# Patient Record
Sex: MALE | Race: WHITE | NOT HISPANIC OR LATINO
[De-identification: names, ages, dates, MRNs, and addresses within clinical notes are randomized per-mention and may not be internally consistent; named-entity substitution may affect disease eponyms.]

---

## 2021-01-25 ENCOUNTER — NON-APPOINTMENT (OUTPATIENT)
Age: 41
End: 2021-01-25

## 2021-01-27 ENCOUNTER — NON-APPOINTMENT (OUTPATIENT)
Age: 41
End: 2021-01-27

## 2021-01-27 ENCOUNTER — APPOINTMENT (OUTPATIENT)
Dept: INTERNAL MEDICINE | Facility: CLINIC | Age: 41
End: 2021-01-27
Payer: COMMERCIAL

## 2021-01-27 VITALS
OXYGEN SATURATION: 96 % | HEIGHT: 73 IN | RESPIRATION RATE: 14 BRPM | SYSTOLIC BLOOD PRESSURE: 158 MMHG | BODY MASS INDEX: 33.4 KG/M2 | TEMPERATURE: 98.1 F | DIASTOLIC BLOOD PRESSURE: 99 MMHG | WEIGHT: 252 LBS | HEART RATE: 116 BPM

## 2021-01-27 VITALS — SYSTOLIC BLOOD PRESSURE: 160 MMHG | DIASTOLIC BLOOD PRESSURE: 100 MMHG

## 2021-01-27 DIAGNOSIS — R91.1 SOLITARY PULMONARY NODULE: ICD-10-CM

## 2021-01-27 DIAGNOSIS — Z78.9 OTHER SPECIFIED HEALTH STATUS: ICD-10-CM

## 2021-01-27 DIAGNOSIS — Z91.89 OTHER SPECIFIED PERSONAL RISK FACTORS, NOT ELSEWHERE CLASSIFIED: ICD-10-CM

## 2021-01-27 DIAGNOSIS — Z87.891 PERSONAL HISTORY OF NICOTINE DEPENDENCE: ICD-10-CM

## 2021-01-27 PROCEDURE — 93000 ELECTROCARDIOGRAM COMPLETE: CPT

## 2021-01-27 PROCEDURE — 99072 ADDL SUPL MATRL&STAF TM PHE: CPT

## 2021-01-27 PROCEDURE — 99204 OFFICE O/P NEW MOD 45 MIN: CPT

## 2021-01-27 NOTE — PHYSICAL EXAM

## 2021-01-27 NOTE — ASSESSMENT
[FreeTextEntry1] : INITIAL VISIT OF 40 Y OLD MALE WITH SYMPTOMATIC R CERUMEN IMPACTION FOR OVER 1 M = ENT EVAL\par ? HTN ,ABNORMAL ECHO ,WCS AND SINUS TACH = LABS AND EKG ;PT WILL BRING RECORDS FROM [facility] \par DYSLIPIDEMIA AND FATTY LIVER= LABS \par RTO IN 2 WEEKS

## 2021-01-27 NOTE — HISTORY OF PRESENT ILLNESS
[de-identified] : PT COMES FOR INITIAL VIIST \par CC OF CLOGGED R EAR CANAL FOR OVER 1 M ,SEEN BY TELEHEALTH AND AT URGENT CARE USED OTC MEDS WITH NO RESOLUTION \par ALSO CONCERN ABOUT DX OF FATTY LIVER IN [facility] 2019\par DX WITH R LUNG NODULE 2018 BY CXR ,REPEAT CXR 2019 = STABLE \par DX WITH ABNORMAL ECHO AND WCS\par

## 2021-01-28 LAB
25(OH)D3 SERPL-MCNC: 19.7 NG/ML
ALBUMIN SERPL ELPH-MCNC: 4.8 G/DL
ALP BLD-CCNC: 98 U/L
ALT SERPL-CCNC: 73 U/L
ANION GAP SERPL CALC-SCNC: 16 MMOL/L
APPEARANCE: CLEAR
AST SERPL-CCNC: 64 U/L
BILIRUB SERPL-MCNC: 0.4 MG/DL
BILIRUBIN URINE: NEGATIVE
BLOOD URINE: NEGATIVE
BUN SERPL-MCNC: 11 MG/DL
CALCIUM SERPL-MCNC: 10.6 MG/DL
CHLORIDE SERPL-SCNC: 99 MMOL/L
CHOLEST SERPL-MCNC: 257 MG/DL
CO2 SERPL-SCNC: 24 MMOL/L
COLOR: YELLOW
CREAT SERPL-MCNC: 0.84 MG/DL
CREAT SPEC-SCNC: 170 MG/DL
GLUCOSE QUALITATIVE U: NEGATIVE
GLUCOSE SERPL-MCNC: 171 MG/DL
HAV IGM SER QL: NONREACTIVE
HBV CORE IGM SER QL: NONREACTIVE
HBV SURFACE AG SER QL: NONREACTIVE
HCV AB SER QL: NONREACTIVE
HCV S/CO RATIO: 0.11 S/CO
HDLC SERPL-MCNC: 50 MG/DL
KETONES URINE: NEGATIVE
LDLC SERPL CALC-MCNC: 163 MG/DL
LEUKOCYTE ESTERASE URINE: NEGATIVE
MICROALBUMIN 24H UR DL<=1MG/L-MCNC: <1.2 MG/DL
MICROALBUMIN/CREAT 24H UR-RTO: NORMAL MG/G
NITRITE URINE: NEGATIVE
NONHDLC SERPL-MCNC: 206 MG/DL
PH URINE: 5.5
POTASSIUM SERPL-SCNC: 4.8 MMOL/L
PROT SERPL-MCNC: 7.9 G/DL
PROTEIN URINE: NEGATIVE
PSA FREE FLD-MCNC: 47 %
PSA FREE SERPL-MCNC: 0.15 NG/ML
PSA SERPL-MCNC: 0.33 NG/ML
SODIUM SERPL-SCNC: 139 MMOL/L
SPECIFIC GRAVITY URINE: 1.02
TRIGL SERPL-MCNC: 218 MG/DL
TSH SERPL-ACNC: 1.18 UIU/ML
UROBILINOGEN URINE: NORMAL

## 2021-02-02 ENCOUNTER — NON-APPOINTMENT (OUTPATIENT)
Age: 41
End: 2021-02-02

## 2021-02-02 LAB
BASOPHILS # BLD AUTO: 0.06 K/UL
BASOPHILS NFR BLD AUTO: 0.6 %
EOSINOPHIL # BLD AUTO: 0.22 K/UL
EOSINOPHIL NFR BLD AUTO: 2.3 %
HCT VFR BLD CALC: 52.1 %
HGB BLD-MCNC: 16.9 G/DL
IMM GRANULOCYTES NFR BLD AUTO: 0.3 %
LYMPHOCYTES # BLD AUTO: 3.02 K/UL
LYMPHOCYTES NFR BLD AUTO: 31.9 %
MAN DIFF?: NORMAL
MCHC RBC-ENTMCNC: 29.1 PG
MCHC RBC-ENTMCNC: 32.4 GM/DL
MCV RBC AUTO: 89.7 FL
MONOCYTES # BLD AUTO: 0.72 K/UL
MONOCYTES NFR BLD AUTO: 7.6 %
NEUTROPHILS # BLD AUTO: 5.42 K/UL
NEUTROPHILS NFR BLD AUTO: 57.3 %
PLATELET # BLD AUTO: 391 K/UL
RBC # BLD: 5.81 M/UL
RBC # FLD: 13.1 %
WBC # FLD AUTO: 9.47 K/UL

## 2021-02-10 ENCOUNTER — TRANSCRIPTION ENCOUNTER (OUTPATIENT)
Age: 41
End: 2021-02-10

## 2021-02-10 ENCOUNTER — APPOINTMENT (OUTPATIENT)
Dept: INTERNAL MEDICINE | Facility: CLINIC | Age: 41
End: 2021-02-10
Payer: COMMERCIAL

## 2021-02-10 VITALS
SYSTOLIC BLOOD PRESSURE: 143 MMHG | RESPIRATION RATE: 14 BRPM | HEART RATE: 91 BPM | TEMPERATURE: 98.1 F | WEIGHT: 252 LBS | HEIGHT: 73 IN | DIASTOLIC BLOOD PRESSURE: 91 MMHG | OXYGEN SATURATION: 96 % | BODY MASS INDEX: 33.4 KG/M2

## 2021-02-10 VITALS — DIASTOLIC BLOOD PRESSURE: 98 MMHG | SYSTOLIC BLOOD PRESSURE: 140 MMHG

## 2021-02-10 DIAGNOSIS — R73.9 HYPERGLYCEMIA, UNSPECIFIED: ICD-10-CM

## 2021-02-10 PROCEDURE — 99072 ADDL SUPL MATRL&STAF TM PHE: CPT

## 2021-02-10 PROCEDURE — 99214 OFFICE O/P EST MOD 30 MIN: CPT

## 2021-02-10 NOTE — ASSESSMENT
[FreeTextEntry1] : 40 Y OLD MALE WITH NEWLY DX HTN = AMLODIPINE- OLMESARTAN 5-20 RX SENT \par ? DM = LABS\par DYSLIPIDEMIA,OBESITY AND FATTY LIVER= STRICT DIET AND EXERCISE \par RTO IN 2 WEEKS\par ECHO ORDERED

## 2021-02-10 NOTE — PHYSICAL EXAM

## 2021-02-10 NOTE — HISTORY OF PRESENT ILLNESS
[de-identified] : PT WAS SEEN AT ER [facility] 5 DAYS AGO DUE TO HA AND ELEVATED BP AT HOME ;WAS RX AMLODIPINE 5 MG AND D/C HOURS LATER SINCE THEN -089-\par CC OF RECURRENT UPPER BODY MUSCLE PAIN AND SPASM THAT INITIALLY DEVELOPED 4 Y AGO\par CC OF PRURITIC SCALP RASH \par LABS 2 WEEKS AGO  AND ELEVATED TRIG AND LFT'S

## 2021-02-11 LAB
ESTIMATED AVERAGE GLUCOSE: 148 MG/DL
FRUCTOSAMINE SERPL-MCNC: 278 UMOL/L
GLUCOSE BS SERPL-MCNC: 97 MG/DL
HBA1C MFR BLD HPLC: 6.8 %

## 2021-02-16 ENCOUNTER — APPOINTMENT (OUTPATIENT)
Dept: HEART AND VASCULAR | Facility: CLINIC | Age: 41
End: 2021-02-16
Payer: COMMERCIAL

## 2021-02-16 VITALS
SYSTOLIC BLOOD PRESSURE: 132 MMHG | HEIGHT: 73 IN | DIASTOLIC BLOOD PRESSURE: 83 MMHG | RESPIRATION RATE: 14 BRPM | WEIGHT: 250 LBS | TEMPERATURE: 98 F | HEART RATE: 90 BPM | BODY MASS INDEX: 33.13 KG/M2 | OXYGEN SATURATION: 96 %

## 2021-02-16 PROCEDURE — 93306 TTE W/DOPPLER COMPLETE: CPT

## 2021-02-16 PROCEDURE — 99215 OFFICE O/P EST HI 40 MIN: CPT

## 2021-02-16 PROCEDURE — 99072 ADDL SUPL MATRL&STAF TM PHE: CPT

## 2021-02-16 NOTE — ASSESSMENT
[FreeTextEntry1] : 39 yo M PMH HTN, HLD, DM here for first evaluation and BP control\par \par CHEST PAIN\par -somehow atypical \par -negative stress test 2 years ago\par -obtain  records from cath at [facility]\par -echo today to r/o any WMA\par -will assess in 2 weeks need for repeat ischemia eval with stress test based on symptoms and previous cath lab results \par \par HTN \par borderline high today \par -cont with current meds\par -keep BP log at home and report BP> 140/90 \par \par HLD\par ldl 163, trig 218, chol 257, hdl 50 on 1.28.2021\par -recc starting atorvastatin 20 mg daily in the setting of DM\par -healthy diet and exercise\par  \par f/u in 2 weeks

## 2021-02-16 NOTE — HISTORY OF PRESENT ILLNESS
[FreeTextEntry1] : 39 yo M PMH HTN, HLD, DM here for first evaluation and BP control\par The patient reports episodes of chest pain for many years. Reports recent work up in [place] for chest pain and reports negative work up.Reports that he had a coronary angiogram 4 years at [facility] (no documentation available)\par Pain is substernal, left and right side of his chest radiating to the back.Reports excellent exercise tolerance\par Denies c palpitations, syncope, presyncope, LE edema, orthopnea.  \par He reports that currently he is under  a lot of stress at work.  \par \par PMH\par HTN\par HLD\par DM\par \par PSH\par meniscus repair\par \par ALL\par NKDA\par \par MEDS\par amlodipine 5-20 mg daily \par \par SH\par former smoker, stopped few months ago, social etoh, no drugs\par works for dept of [employer] \par \par EKG 1/27/2021 SR,wnl\par \par EXERCISE STRESS TEST ([facility]) 9/20/2018\par 8:40 Aly protocol, 91% MPHR \par negative stress test \par

## 2021-02-16 NOTE — PHYSICAL EXAM
[General Appearance - Well Developed] : well developed [Normal Appearance] : normal appearance [Well Groomed] : well groomed [General Appearance - Well Nourished] : well nourished [No Deformities] : no deformities [General Appearance - In No Acute Distress] : no acute distress [Normal Oral Mucosa] : normal oral mucosa [No Oral Pallor] : no oral pallor [No Oral Cyanosis] : no oral cyanosis [Normal Jugular Venous A Waves Present] : normal jugular venous A waves present [Normal Jugular Venous V Waves Present] : normal jugular venous V waves present [No Jugular Venous Rodriguez A Waves] : no jugular venous rodriguez A waves [Heart Rate And Rhythm] : heart rate and rhythm were normal [Heart Sounds] : normal S1 and S2 [Murmurs] : no murmurs present [Respiration, Rhythm And Depth] : normal respiratory rhythm and effort [Exaggerated Use Of Accessory Muscles For Inspiration] : no accessory muscle use [Auscultation Breath Sounds / Voice Sounds] : lungs were clear to auscultation bilaterally [Abdomen Soft] : soft [Abdomen Tenderness] : non-tender [Abdomen Mass (___ Cm)] : no abdominal mass palpated [Nail Clubbing] : no clubbing of the fingernails [Cyanosis, Localized] : no localized cyanosis [Petechial Hemorrhages (___cm)] : no petechial hemorrhages [] : no ischemic changes

## 2021-02-18 ENCOUNTER — TRANSCRIPTION ENCOUNTER (OUTPATIENT)
Age: 41
End: 2021-02-18

## 2021-02-24 ENCOUNTER — APPOINTMENT (OUTPATIENT)
Dept: INTERNAL MEDICINE | Facility: CLINIC | Age: 41
End: 2021-02-24
Payer: COMMERCIAL

## 2021-02-24 VITALS
BODY MASS INDEX: 32.07 KG/M2 | TEMPERATURE: 98.6 F | RESPIRATION RATE: 14 BRPM | HEART RATE: 84 BPM | DIASTOLIC BLOOD PRESSURE: 91 MMHG | WEIGHT: 242 LBS | OXYGEN SATURATION: 97 % | SYSTOLIC BLOOD PRESSURE: 155 MMHG | HEIGHT: 73 IN

## 2021-02-24 DIAGNOSIS — N20.0 CALCULUS OF KIDNEY: ICD-10-CM

## 2021-02-24 PROCEDURE — 99072 ADDL SUPL MATRL&STAF TM PHE: CPT

## 2021-02-24 PROCEDURE — 99215 OFFICE O/P EST HI 40 MIN: CPT

## 2021-02-24 RX ORDER — ATORVASTATIN CALCIUM 20 MG/1
20 TABLET, FILM COATED ORAL
Qty: 30 | Refills: 3 | Status: DISCONTINUED | COMMUNITY
Start: 2021-02-16 | End: 2021-02-24

## 2021-02-24 NOTE — ASSESSMENT
[FreeTextEntry1] : 40 Y OLD MALE WITH R NEPHROLITHIASIS=  EVAL \par NEW ONSET DM WITH HBA1C 6.8 = REDUCE METFORMIN NEWLY RX FROM 500 MG BID  MG QD WITH LARGETS MEAL\par DYSLIPIDEMIA = INTOLERANT TO LIPITOR;START RUSUVASTATIN 5 MG DAILY \par STOP ASA SINCE CARDIAC W/U IS NEG\par RTO IN 1 M

## 2021-02-24 NOTE — HISTORY OF PRESENT ILLNESS
[de-identified] : PT WAS SEEN AT ER [facility] 6 DAYS AGO FRO R NEPHROLITHIASIS,D/C AFTER RENAL COLIC RESOLVED\par NEXT DAY DEVELOPED R SIDE CHEST PAIN AND HOSPITALIZED FOR 3 DAYS WITH CARDIOLOGY EVAL ,NUCLEAR STRESS TEST AND D/C ON ASA,METFORMIN 500 BID ,METOPROLOL 25 MG DAILY AND LIPITOR 20 MG ,LAST ONE PT DID NOT TAKE SINCE WHEN RX IT RECENTLY DEVELOPED SEVERE SIDE EFFECTS  WITH CONFUSION ,BODY ACHES AND HA

## 2021-02-25 ENCOUNTER — APPOINTMENT (OUTPATIENT)
Dept: RHEUMATOLOGY | Facility: CLINIC | Age: 41
End: 2021-02-25
Payer: COMMERCIAL

## 2021-02-25 VITALS
OXYGEN SATURATION: 97 % | HEIGHT: 73 IN | DIASTOLIC BLOOD PRESSURE: 62 MMHG | TEMPERATURE: 97.8 F | RESPIRATION RATE: 18 BRPM | SYSTOLIC BLOOD PRESSURE: 112 MMHG | BODY MASS INDEX: 32.07 KG/M2 | HEART RATE: 86 BPM | WEIGHT: 242 LBS

## 2021-02-25 DIAGNOSIS — M54.9 DORSALGIA, UNSPECIFIED: ICD-10-CM

## 2021-02-25 DIAGNOSIS — M79.10 MYALGIA, UNSPECIFIED SITE: ICD-10-CM

## 2021-02-25 PROCEDURE — 99205 OFFICE O/P NEW HI 60 MIN: CPT

## 2021-02-25 PROCEDURE — 99072 ADDL SUPL MATRL&STAF TM PHE: CPT

## 2021-02-25 NOTE — REVIEW OF SYSTEMS
[Fever] : no fever [Chills] : no chills [Feeling Poorly] : not feeling poorly [Feeling Tired] : not feeling tired [Recent Weight Gain (___ Lbs)] : no recent weight gain [Recent Weight Loss (___ Lbs)] : no recent weight loss [Eye Pain] : no eye pain [Red Eyes] : eyes not red [Eyesight Problems] : no eyesight problems [Discharge From Eyes] : no purulent discharge from the eyes [Dry Eyes] : no dryness of the eyes [Eyes Itch] : no itching of the eyes [Earache] : no earache [Loss Of Hearing] : no hearing loss [Nosebleeds] : no nosebleeds [Nasal Discharge] : no nasal discharge [Sore Throat] : no sore throat [Hoarseness] : no hoarseness [Heart Rate Is Slow] : the heart rate was not slow [Heart Rate Is Fast] : the heart rate was not fast [Chest Pain] : no chest pain [Palpitations] : no palpitations [Leg Claudication] : no intermittent leg claudication [Lower Ext Edema] : no extremity edema [Shortness Of Breath] : no shortness of breath [Wheezing] : no wheezing [Cough] : no cough [SOB on Exertion] : no shortness of breath during exertion [Orthopnea] : no orthopnea [PND] : no PND [Abdominal Pain] : no abdominal pain [Vomiting] : no vomiting [Constipation] : no constipation [Diarrhea] : no diarrhea [Heartburn] : no heartburn [Melena] : no melena [Skin Lesions] : no skin lesions [Skin Wound] : no skin wound [Itching] : no itching [Change In A Mole] : no change in a mole [Dry Skin] : no dry skin [An Unusual Growth] : no unusual growth on the skin [As Noted in HPI] : as noted in HPI

## 2021-02-25 NOTE — PHYSICAL EXAM
[General Appearance - Well Nourished] : well nourished [General Appearance - Well Developed] : well developed [Sclera] : the sclera and conjunctiva were normal [Examination Of The Oral Cavity] : the lips and gums were normal [Oropharynx] : the oropharynx was normal [Auscultation Breath Sounds / Voice Sounds] : lungs were clear to auscultation bilaterally [Heart Sounds] : normal S1 and S2 [Heart Sounds Gallop] : no gallops [Murmurs] : no murmurs [Heart Sounds Pericardial Friction Rub] : no pericardial rub [Abdomen Soft] : soft [Abdomen Tenderness] : non-tender [] : no hepato-splenomegaly [Skin Color & Pigmentation] : normal skin color and pigmentation [FreeTextEntry1] : strength 5/5 X 4 extremities [Oriented To Time, Place, And Person] : oriented to person, place, and time

## 2021-02-25 NOTE — HISTORY OF PRESENT ILLNESS
[FreeTextEntry1] : Referring physician: Dr. Thien Dennis\par \par 39 y/o here for consultation. \par \par Pt reports for years of b/l shoulder blades. Before it was on and off but has become persistent. Pt did acupuncture, and it went away. Pt says after a few months, pain started coming again, radiating to chest. Pt says that activities makes the pain worse. \par No joint swelling. Sometimes feels stiffness in shoulders. Has past injury in L shoulders. Had meniscal tear on R knee, sometimes gets R knee pain. \par \par Sometimes feels numbness on L hand w certain position. \par \par Pt recently took atorvastatin, and pt had mm pain. Pt was changed to rosuvastatin, and now feel better. \par \par Reports lumbar back disease. \par \par Pt recently hospitalized for nephrolithiasis. Had cardiac work up for chest discomfort, which patient reports was negative. 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 \par \par No fevers, chills, rashes, weakness, fatigue, SOB, cough. \par \par

## 2021-02-25 NOTE — ASSESSMENT
[FreeTextEntry1] : 41 y/o M w upper back pain\par =upper back pain radiating to upper chest\par =Xray of cervical spine done 2/21 w cervical DDD\par =b/l shoulders positive for RCT\par =past intolerance ot statin \par \par Strane location for pain, but pt might have extension of DDD to thoracic area vs RCT. However, location of pain is unusual for these two conditions, especially upper chest pain. 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 However, doubt pt has same issue w rosuvastatin, given focality of pain. \par \par Plan\par Obtain CK level\par PT referral\par gabapentin for pain\par RTO 3 months

## 2021-02-25 NOTE — CONSULT LETTER
[Dear  ___] : Dear  [unfilled], [Consult Letter:] : I had the pleasure of evaluating your patient, [unfilled]. [Consult Closing:] : Thank you very much for allowing me to participate in the care of this patient.  If you have any questions, please do not hesitate to contact me. [Sincerely,] : Sincerely, [FreeTextEntry3] : Aj Kaur MD

## 2021-02-25 NOTE — DATA REVIEWED
[FreeTextEntry1] : Cervical spine xrays 2/21 reviewed\par Mod multilevel disc space narrowing w disc osteophyte complex C4-C5/ and C5-C6. \par \par CXR reviewed 2/21 reviewd wnl \par \par CT A/P 2/21 reviewed\par liver steatosis, 4 mm stone in R uretorovesicular junction compatible w obstructive stone.

## 2021-02-25 NOTE — ASSESSMENT
[FreeTextEntry1] : 39 y/o M w upper back pain\par =upper back pain radiating to upper chest\par =Xray of cervical spine done 2/21 w cervical DDD\par =b/l shoulders positive for RCT\par =past intolerance ot statin \par \par Strane location for pain, but pt might have extension of DDD to thoracic area vs RCT. However, location of pain is unusual for these two conditions, especially upper chest pain. 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 However, doubt pt has same issue w rosuvastatin, given focality of pain. \par \par Plan\par Obtain CK level\par PT referral\par gabapentin for pain\par RTO 3 months

## 2021-02-26 ENCOUNTER — NON-APPOINTMENT (OUTPATIENT)
Age: 41
End: 2021-02-26

## 2021-03-02 ENCOUNTER — APPOINTMENT (OUTPATIENT)
Dept: HEART AND VASCULAR | Facility: CLINIC | Age: 41
End: 2021-03-02
Payer: COMMERCIAL

## 2021-03-02 VITALS
WEIGHT: 240 LBS | RESPIRATION RATE: 14 BRPM | HEART RATE: 89 BPM | OXYGEN SATURATION: 96 % | HEIGHT: 73 IN | TEMPERATURE: 96.8 F | SYSTOLIC BLOOD PRESSURE: 138 MMHG | DIASTOLIC BLOOD PRESSURE: 82 MMHG | BODY MASS INDEX: 31.81 KG/M2

## 2021-03-02 PROCEDURE — 99215 OFFICE O/P EST HI 40 MIN: CPT

## 2021-03-02 PROCEDURE — 99072 ADDL SUPL MATRL&STAF TM PHE: CPT

## 2021-03-02 NOTE — ASSESSMENT
[FreeTextEntry1] : 39 yo M PMH HTN, HLD, DM here for follow up\par \par CHEST PAIN\par - atypical \par - reported recent negative nuc stress test 2/2021 at [facility] in setting of recent hospitalization with complete cardiac work up unremarkable \par -obtain  records from [facility] with offical results \par -echo 2/19/2021 EF 65% \par  -in setting of above ( if nuc stress test wnl), in addition to negative stress test in 2018, etiology of chest pain is not ischemic. \par -recc to continue with work up of non cardiac etiologies of chest pain, recc heathy diet, exercise and weight loss \par \par \par HTN \par -cont with  amlodipine 10-olmesartan 20 mg daily and keep BP log at home \par -If  BP persistently > 140/90 will uptitrate to amlodipine 10-olmesartan 40 mg daily  \par - recc healthy diet and exercise, recc weigh loss\par \par HLD\par ldl 163, trig 218, chol 257, hdl 50 on 1.28.2021\par -the patient had intolerance to atorvastatin 20 mg daily, switched to rosuvsatatin 5 mg daily, denies any more symptoms\par -recc healthy diet and exercise\par -repeat lipid panel in 4 months \par  \par f/u in 4 months or sooner if any symptoms

## 2021-03-02 NOTE — HISTORY OF PRESENT ILLNESS
[FreeTextEntry1] : 41 yo M PMH HTN, HLD, DM here for follow up\par The patient was recently hospitalized at [facility] for atypical chest pain. As per patient's report underwent extensive cardiac work up including nuclear stress test and all resulted negative (no documentation available at this time). \par The patient also reported intolerance to atorvastatin (headache, generalized muscle pain). Started on rosuvastatin, denies any symptoms. \par Today he reports feeling fine. Denies chest pain, shortness of breath, palpitations, syncope, presyncope, LE edema, orthopnea. \par Started exercising and monitoring his diet\par \par \par As per previous note 2/16/2021 \par The patient reports episodes of chest pain for many years. Reports recent work up in [place] for chest pain and reports negative work up.Reports that he had a coronary angiogram 4 years at [facility] (no documentation available)\par Pain is substernal, left and right side of his chest radiating to the back.Reports excellent exercise tolerance\par Denies c palpitations, syncope, presyncope, LE edema, orthopnea.  \par He reports that currently he is under  a lot of stress at work.  \par \par PMH\par HTN\par HLD\par DM\par \par PSH\par meniscus repair\par \par ALL\par NKDA\par \par MEDS\par amlodipine-olmesartn  5-20 mg daily \par rosuvastatin 5 mg daily \par \par SH\par former smoker, stopped few months ago, social etoh, no drugs\par works for dept of [employer] \par \par EKG 1/27/2021 SR,wnl\par \par EXERCISE STRESS TEST ([facility]) 9/20/2018\par 8:40 Aly protocol, 91% MPHR \par negative stress test \par \par ECHO 2/16/2021\par EF 60-65% \par \par

## 2021-03-05 ENCOUNTER — APPOINTMENT (OUTPATIENT)
Dept: UROLOGY | Facility: CLINIC | Age: 41
End: 2021-03-05
Payer: COMMERCIAL

## 2021-03-05 VITALS
DIASTOLIC BLOOD PRESSURE: 78 MMHG | OXYGEN SATURATION: 96 % | SYSTOLIC BLOOD PRESSURE: 129 MMHG | HEART RATE: 90 BPM | RESPIRATION RATE: 14 BRPM | HEIGHT: 73 IN | WEIGHT: 240 LBS | TEMPERATURE: 96 F | BODY MASS INDEX: 31.81 KG/M2

## 2021-03-05 DIAGNOSIS — N20.1 CALCULUS OF URETER: ICD-10-CM

## 2021-03-05 PROCEDURE — 99204 OFFICE O/P NEW MOD 45 MIN: CPT

## 2021-03-05 PROCEDURE — 99072 ADDL SUPL MATRL&STAF TM PHE: CPT

## 2021-03-05 NOTE — ASSESSMENT
[Ureteral Stone (592.1\N20.1)] : position [FreeTextEntry1] : 41 yo with suspected stone passage\par the patient is asymptomatic\par \par \par - flomax 0.4 mg po qhs\par - toradol 10 mg po prn\par - renal and bladder US\par - metabolic stone workup\par - f/u in 2 weeks to review\par

## 2021-03-05 NOTE — HISTORY OF PRESENT ILLNESS
[FreeTextEntry1] : 39 yo hospitalized on 2/18/2021 for a right UVJ stone\par he was hospitalized for 4 days \par \par CT scan 2/18/2021\par 4mm stone in the region of the right UVJ \par \par presently asymptomatic\par \par IPSS 5\par IIEF 22\par \par \par \par

## 2021-03-09 ENCOUNTER — NON-APPOINTMENT (OUTPATIENT)
Age: 41
End: 2021-03-09

## 2021-03-24 ENCOUNTER — APPOINTMENT (OUTPATIENT)
Dept: INTERNAL MEDICINE | Facility: CLINIC | Age: 41
End: 2021-03-24
Payer: COMMERCIAL

## 2021-03-24 VITALS
HEIGHT: 73 IN | HEART RATE: 104 BPM | WEIGHT: 240 LBS | BODY MASS INDEX: 31.81 KG/M2 | SYSTOLIC BLOOD PRESSURE: 120 MMHG | TEMPERATURE: 97.6 F | RESPIRATION RATE: 14 BRPM | DIASTOLIC BLOOD PRESSURE: 77 MMHG | OXYGEN SATURATION: 95 %

## 2021-03-24 PROCEDURE — 99072 ADDL SUPL MATRL&STAF TM PHE: CPT

## 2021-03-24 PROCEDURE — 99214 OFFICE O/P EST MOD 30 MIN: CPT

## 2021-03-24 NOTE — HISTORY OF PRESENT ILLNESS
[de-identified] : PT COMES FOR F/U DYSLIPIDEMIA AND DM \par DEVELOPED ACUTE URTICARIA AFTER 1ST DOSE OF TAMSULOSIN FEW WEEKS AGO\par EXERCISING DAILY

## 2021-03-24 NOTE — ASSESSMENT
[FreeTextEntry1] : 40 Y OLD MALE WITH PMX OF HTN ,DM ,DYSLIPIDEMIA= LABS RTO IN 2 M \par S/P ALLERGIC URTICARIA TO TAMSULOSIN =RESOLVED

## 2021-03-25 LAB
ALBUMIN SERPL ELPH-MCNC: 4.6 G/DL
ALP BLD-CCNC: 83 U/L
ALT SERPL-CCNC: 61 U/L
ANION GAP SERPL CALC-SCNC: 12 MMOL/L
AST SERPL-CCNC: 65 U/L
BILIRUB SERPL-MCNC: 0.4 MG/DL
BUN SERPL-MCNC: 13 MG/DL
CALCIUM SERPL-MCNC: 10.5 MG/DL
CHLORIDE SERPL-SCNC: 100 MMOL/L
CHOLEST SERPL-MCNC: 144 MG/DL
CO2 SERPL-SCNC: 26 MMOL/L
CREAT SERPL-MCNC: 0.87 MG/DL
GLUCOSE SERPL-MCNC: 123 MG/DL
HDLC SERPL-MCNC: 50 MG/DL
LDLC SERPL CALC-MCNC: 75 MG/DL
NONHDLC SERPL-MCNC: 94 MG/DL
POTASSIUM SERPL-SCNC: 5.3 MMOL/L
PROT SERPL-MCNC: 7.3 G/DL
SODIUM SERPL-SCNC: 138 MMOL/L
TRIGL SERPL-MCNC: 92 MG/DL

## 2021-03-29 ENCOUNTER — RX RENEWAL (OUTPATIENT)
Age: 41
End: 2021-03-29

## 2021-04-01 LAB — FRUCTOSAMINE SERPL-MCNC: 232 UMOL/L

## 2021-04-02 ENCOUNTER — APPOINTMENT (OUTPATIENT)
Dept: UROLOGY | Facility: CLINIC | Age: 41
End: 2021-04-02

## 2021-04-16 ENCOUNTER — APPOINTMENT (OUTPATIENT)
Dept: INTERNAL MEDICINE | Facility: CLINIC | Age: 41
End: 2021-04-16
Payer: COMMERCIAL

## 2021-04-16 VITALS
BODY MASS INDEX: 31.54 KG/M2 | DIASTOLIC BLOOD PRESSURE: 81 MMHG | OXYGEN SATURATION: 96 % | HEIGHT: 73 IN | RESPIRATION RATE: 14 BRPM | HEART RATE: 101 BPM | WEIGHT: 238 LBS | TEMPERATURE: 97.1 F | SYSTOLIC BLOOD PRESSURE: 145 MMHG

## 2021-04-16 DIAGNOSIS — U07.1 COVID-19: ICD-10-CM

## 2021-04-16 PROCEDURE — 99072 ADDL SUPL MATRL&STAF TM PHE: CPT

## 2021-04-16 PROCEDURE — 99214 OFFICE O/P EST MOD 30 MIN: CPT

## 2021-04-16 RX ORDER — AMLODIPINE AND OLMESARTAN MEDOXOMIL 10; 20 MG/1; MG/1
10-20 TABLET ORAL DAILY
Qty: 30 | Refills: 3 | Status: DISCONTINUED | COMMUNITY
Start: 2021-02-10 | End: 2021-04-16

## 2021-04-16 RX ORDER — GABAPENTIN 300 MG/1
300 CAPSULE ORAL
Qty: 30 | Refills: 1 | Status: DISCONTINUED | COMMUNITY
Start: 2021-02-25 | End: 2021-04-16

## 2021-04-16 RX ORDER — TAMSULOSIN HYDROCHLORIDE 0.4 MG/1
0.4 CAPSULE ORAL
Qty: 30 | Refills: 1 | Status: DISCONTINUED | COMMUNITY
Start: 2021-03-05 | End: 2021-04-16

## 2021-04-16 NOTE — HISTORY OF PRESENT ILLNESS
[de-identified] : PT WAS DX WITH COVID-19 INF 3 WEEKS AGO ;HAD INTENSE SX FOR 1 WEEK ,LAST 10 DAYS FEELING GREAT ,HAD RESUMED REG EXERCISE ,OFFERS NO NEW COMPLAINS

## 2021-04-16 NOTE — ASSESSMENT
[FreeTextEntry1] : 40 Y OLD MALE S/P COVID-19 INF 3 WEEKS AGO WITH RESOLVED SX LAST 7-10 DAYS= IGG,D DIMER AND CPR\par DM ,DYSLIPIDEMIA = CBC ,CMP \par RX SENT \par RTO IN 1-2 M THEN WILL ORDER HBA1C AND LIPIDS\par RESUME ASA 81 MG DAILY

## 2021-04-18 LAB
ALBUMIN SERPL ELPH-MCNC: 5.1 G/DL
ALP BLD-CCNC: 83 U/L
ALT SERPL-CCNC: 45 U/L
ANION GAP SERPL CALC-SCNC: 15 MMOL/L
AST SERPL-CCNC: 52 U/L
BILIRUB SERPL-MCNC: 0.5 MG/DL
BUN SERPL-MCNC: 16 MG/DL
CALCIUM SERPL-MCNC: 11 MG/DL
CHLORIDE SERPL-SCNC: 100 MMOL/L
CO2 SERPL-SCNC: 22 MMOL/L
COVID-19 NUCLEOCAPSID  GAM ANTIBODY INTERPRETATION: POSITIVE
CREAT SERPL-MCNC: 0.9 MG/DL
CRP SERPL-MCNC: 4 MG/L
DEPRECATED D DIMER PPP IA-ACNC: <150 NG/ML DDU
GLUCOSE SERPL-MCNC: 103 MG/DL
POTASSIUM SERPL-SCNC: 5.2 MMOL/L
PROT SERPL-MCNC: 7.7 G/DL
SARS-COV-2 AB SERPL QL IA: 16.9 INDEX
SODIUM SERPL-SCNC: 137 MMOL/L

## 2021-04-26 LAB
BASOPHILS # BLD AUTO: 0.04 K/UL
BASOPHILS NFR BLD AUTO: 0.4 %
EOSINOPHIL # BLD AUTO: 0.06 K/UL
EOSINOPHIL NFR BLD AUTO: 0.5 %
HCT VFR BLD CALC: 46.5 %
HGB BLD-MCNC: 15.4 G/DL
IMM GRANULOCYTES NFR BLD AUTO: 0.3 %
LYMPHOCYTES # BLD AUTO: 2.5 K/UL
LYMPHOCYTES NFR BLD AUTO: 22.2 %
MAN DIFF?: NORMAL
MCHC RBC-ENTMCNC: 29.5 PG
MCHC RBC-ENTMCNC: 33.1 GM/DL
MCV RBC AUTO: 89.1 FL
MONOCYTES # BLD AUTO: 0.8 K/UL
MONOCYTES NFR BLD AUTO: 7.1 %
NEUTROPHILS # BLD AUTO: 7.84 K/UL
NEUTROPHILS NFR BLD AUTO: 69.5 %
PLATELET # BLD AUTO: 429 K/UL
RBC # BLD: 5.22 M/UL
RBC # FLD: 13 %
WBC # FLD AUTO: 11.27 K/UL

## 2021-05-25 ENCOUNTER — APPOINTMENT (OUTPATIENT)
Dept: RHEUMATOLOGY | Facility: CLINIC | Age: 41
End: 2021-05-25
Payer: COMMERCIAL

## 2021-05-25 VITALS
WEIGHT: 234 LBS | HEIGHT: 73 IN | TEMPERATURE: 98 F | SYSTOLIC BLOOD PRESSURE: 151 MMHG | BODY MASS INDEX: 31.01 KG/M2 | RESPIRATION RATE: 15 BRPM | OXYGEN SATURATION: 97 % | DIASTOLIC BLOOD PRESSURE: 94 MMHG | HEART RATE: 73 BPM

## 2021-05-25 DIAGNOSIS — G72.0 DRUG-INDUCED MYOPATHY: ICD-10-CM

## 2021-05-25 DIAGNOSIS — T46.6X5A DRUG-INDUCED MYOPATHY: ICD-10-CM

## 2021-05-25 PROCEDURE — 99213 OFFICE O/P EST LOW 20 MIN: CPT

## 2021-05-25 NOTE — PHYSICAL EXAM
[General Appearance - Well Nourished] : well nourished [General Appearance - Well Developed] : well developed [Sclera] : the sclera and conjunctiva were normal [Examination Of The Oral Cavity] : the lips and gums were normal [Oropharynx] : the oropharynx was normal [Auscultation Breath Sounds / Voice Sounds] : lungs were clear to auscultation bilaterally [Heart Sounds] : normal S1 and S2 [Murmurs] : no murmurs [Heart Sounds Gallop] : no gallops [Heart Sounds Pericardial Friction Rub] : no pericardial rub [Abdomen Soft] : soft [Abdomen Tenderness] : non-tender [] : no hepato-splenomegaly [Musculoskeletal - Swelling] : no joint swelling seen [Skin Color & Pigmentation] : normal skin color and pigmentation [FreeTextEntry1] : strength 5/5 X 4 extremities [Oriented To Time, Place, And Person] : oriented to person, place, and time

## 2021-05-25 NOTE — ASSESSMENT
[FreeTextEntry1] : 41 y/o M w statin induced myopathy\par =polymyalgias, induced by lipitor; now resolved w removal of lipitor\par =CKs wnl \par =now on crestor, tolerating \par \par Pt likely has no issues. Will check CK level. \par \par Plan\par Check CK\par c/w crestor for now\par RTO PRN

## 2021-05-25 NOTE — HISTORY OF PRESENT ILLNESS
[___ Month(s) Ago] : [unfilled] month(s) ago [FreeTextEntry1] : =pt feels well\par =had COVID19 recently, that did not require hospitalization\par =pt after this started lifting weights, doing exercise, and has lost weight\par =currenlty on crestor for HLD, and feels fine\par =no fevers, SOB, CP, abdominal pain, n/v/d, rashes, joint pain, swelling

## 2021-05-25 NOTE — DATA REVIEWED
[FreeTextEntry1] : labs reviewed from 3/21\par CMP  w calcium 11, albumin 5.1, AST 5s\par WBC 11s, plts 400s \par COVID19 nucleocapsid ab 16

## 2021-05-26 ENCOUNTER — APPOINTMENT (OUTPATIENT)
Dept: INTERNAL MEDICINE | Facility: CLINIC | Age: 41
End: 2021-05-26
Payer: COMMERCIAL

## 2021-05-26 VITALS
HEIGHT: 73 IN | BODY MASS INDEX: 31.01 KG/M2 | OXYGEN SATURATION: 96 % | SYSTOLIC BLOOD PRESSURE: 147 MMHG | HEART RATE: 78 BPM | TEMPERATURE: 98 F | DIASTOLIC BLOOD PRESSURE: 90 MMHG | WEIGHT: 234 LBS

## 2021-05-26 VITALS — SYSTOLIC BLOOD PRESSURE: 132 MMHG | DIASTOLIC BLOOD PRESSURE: 80 MMHG

## 2021-05-26 LAB
ALBUMIN SERPL ELPH-MCNC: 4.9 G/DL
ALP BLD-CCNC: 94 U/L
ALT SERPL-CCNC: 35 U/L
ANION GAP SERPL CALC-SCNC: 12 MMOL/L
AST SERPL-CCNC: 34 U/L
BILIRUB SERPL-MCNC: 0.4 MG/DL
BUN SERPL-MCNC: 17 MG/DL
CALCIUM SERPL-MCNC: 10.3 MG/DL
CHLORIDE SERPL-SCNC: 100 MMOL/L
CHOLEST SERPL-MCNC: 160 MG/DL
CK SERPL-CCNC: 42 U/L
CO2 SERPL-SCNC: 25 MMOL/L
CREAT SERPL-MCNC: 0.78 MG/DL
ESTIMATED AVERAGE GLUCOSE: 114 MG/DL
GLUCOSE SERPL-MCNC: 108 MG/DL
HBA1C MFR BLD HPLC: 5.6 %
HDLC SERPL-MCNC: 51 MG/DL
LDLC SERPL CALC-MCNC: 93 MG/DL
NONHDLC SERPL-MCNC: 108 MG/DL
POTASSIUM SERPL-SCNC: 5 MMOL/L
PROT SERPL-MCNC: 7.6 G/DL
SODIUM SERPL-SCNC: 138 MMOL/L
TRIGL SERPL-MCNC: 75 MG/DL

## 2021-05-26 PROCEDURE — 99214 OFFICE O/P EST MOD 30 MIN: CPT

## 2021-05-26 NOTE — HISTORY OF PRESENT ILLNESS
[de-identified] : pt comes for f/u \par feeling fine \par exercises 5/week\par fluctuating bp readings from 126-150/75-90

## 2021-05-26 NOTE — ASSESSMENT
[FreeTextEntry1] : 41 Y OLD MALE WITH PMX OF HTN NOT AT GOAL= INCREASED AMLODIPINE -5/20 TO 5/40\par DM AND DYSLIPIDEMIA = LABS\par ARLEEN = LORATADINE AND PATANOL RX \par RTO IN 3 M \par MAY GET COVID-19 VACCINE NOW AFTER  2M OF INF

## 2021-06-24 ENCOUNTER — RX CHANGE (OUTPATIENT)
Age: 41
End: 2021-06-24

## 2021-07-15 ENCOUNTER — APPOINTMENT (OUTPATIENT)
Dept: HEART AND VASCULAR | Facility: CLINIC | Age: 41
End: 2021-07-15
Payer: COMMERCIAL

## 2021-07-15 ENCOUNTER — RX CHANGE (OUTPATIENT)
Age: 41
End: 2021-07-15

## 2021-07-15 VITALS
SYSTOLIC BLOOD PRESSURE: 133 MMHG | HEART RATE: 76 BPM | OXYGEN SATURATION: 96 % | DIASTOLIC BLOOD PRESSURE: 89 MMHG | TEMPERATURE: 98.2 F | RESPIRATION RATE: 14 BRPM | WEIGHT: 232 LBS | HEIGHT: 73 IN | BODY MASS INDEX: 30.75 KG/M2

## 2021-07-15 PROCEDURE — 99214 OFFICE O/P EST MOD 30 MIN: CPT

## 2021-07-15 NOTE — ASSESSMENT
[FreeTextEntry1] : 40 yo M PMH HTN, HLD, DM here for follow up\par \par CHEST PAIN\par -Resolved\par - reported recent negative nuc stress test 2/2021 at [facility] in setting of recent hospitalization with complete cardiac work up unremarkable \par -echo 2/19/2021 EF 65% \par -recc to continue withheathy diet, exercise and weight loss \par \par \par HTN \par -Borderline high today\par -recc to keep BP log at home and call if BP> 140/90\par -cont with  amlodipine 5-olmesartan 40 mg daily and keep BP log at home \par - recc healthy diet and exercise, recc weigh loss\par \par HLD\par Fasting lipid panel reviewed\par -Continue with rosuvastatin 5 mg daily\par -recc healthy diet and exercise \par  \par f/u in 6 months or sooner if any symptoms

## 2021-07-15 NOTE — HISTORY OF PRESENT ILLNESS
[FreeTextEntry1] : 42 yo M PMH HTN, HLD, DM here for follow up\par Reports feeling fine, started losing weight, goes to the gym regularly without any symptoms.\par Denies chest pain, shortness of breath, palpitations, syncope, presyncope, LE edema, orthopnea. \par \par PMH\par HTN\par HLD\par DM\par \par PSH\par meniscus repair\par \par ALL\par NKDA\par \par MEDS\par amlodipine-olmesartn  5-40 mg daily \par rosuvastatin 5 mg daily \par \par SH\par former smoker, stopped few months ago, social etoh, no drugs\par works for dept of [employer] \par \par EKG 1/27/2021 SR,wnl\par \par EXERCISE STRESS TEST ([facility]) 9/20/2018\par 8:40 Aly protocol, 91% MPHR \par negative stress test \par \par ECHO 2/16/2021\par EF 60-65% \par \par Labs 5/26/2021\par Glycerides 75\par Cholesterol 160\par HDL 51\par LDL 93\par CMP within normal limits\par \par

## 2021-08-25 ENCOUNTER — APPOINTMENT (OUTPATIENT)
Dept: INTERNAL MEDICINE | Facility: CLINIC | Age: 41
End: 2021-08-25
Payer: COMMERCIAL

## 2021-08-25 VITALS
OXYGEN SATURATION: 97 % | RESPIRATION RATE: 14 BRPM | TEMPERATURE: 98.3 F | HEART RATE: 75 BPM | BODY MASS INDEX: 30.88 KG/M2 | HEIGHT: 73 IN | WEIGHT: 233 LBS | DIASTOLIC BLOOD PRESSURE: 80 MMHG | SYSTOLIC BLOOD PRESSURE: 133 MMHG

## 2021-08-25 LAB
ALBUMIN SERPL ELPH-MCNC: 4.7 G/DL
ALP BLD-CCNC: 72 U/L
ALT SERPL-CCNC: 34 U/L
ANION GAP SERPL CALC-SCNC: 14 MMOL/L
AST SERPL-CCNC: 25 U/L
BILIRUB SERPL-MCNC: 0.4 MG/DL
BUN SERPL-MCNC: 15 MG/DL
CALCIUM SERPL-MCNC: 10.3 MG/DL
CHLORIDE SERPL-SCNC: 102 MMOL/L
CO2 SERPL-SCNC: 23 MMOL/L
CREAT SERPL-MCNC: 0.73 MG/DL
ESTIMATED AVERAGE GLUCOSE: 128 MG/DL
GLUCOSE SERPL-MCNC: 110 MG/DL
HBA1C MFR BLD HPLC: 6.1 %
POTASSIUM SERPL-SCNC: 4.6 MMOL/L
PROT SERPL-MCNC: 7.5 G/DL
SODIUM SERPL-SCNC: 138 MMOL/L

## 2021-08-25 PROCEDURE — 99214 OFFICE O/P EST MOD 30 MIN: CPT

## 2021-08-25 NOTE — ASSESSMENT
[FreeTextEntry1] : 41 Y OLD MALE WITH PMX OF HTN = STABLE \par DM AND DYSLIPIDEMIA = LABS \par MILD OBESITY = ADVISED\par RTO IN 3 M

## 2021-08-25 NOTE — HISTORY OF PRESENT ILLNESS
[de-identified] : PT COMES FOR F/U FEELING FINE ;RECEIVED  PFIZER MRNA COVID-19 VACCINE X2 DOSES\par BP AT HOME 120-130/70-80

## 2021-08-25 NOTE — PHYSICAL EXAM
[No Acute Distress] : no acute distress [Well Nourished] : well nourished [Well Developed] : well developed [Well-Appearing] : well-appearing [Normal Sclera/Conjunctiva] : normal sclera/conjunctiva [PERRL] : pupils equal round and reactive to light [EOMI] : extraocular movements intact [No JVD] : no jugular venous distention [No Lymphadenopathy] : no lymphadenopathy [Supple] : supple [Thyroid Normal, No Nodules] : the thyroid was normal and there were no nodules present [No Respiratory Distress] : no respiratory distress  [No Accessory Muscle Use] : no accessory muscle use [Clear to Auscultation] : lungs were clear to auscultation bilaterally [Normal Rate] : normal rate  [Regular Rhythm] : with a regular rhythm [Normal S1, S2] : normal S1 and S2 [No Carotid Bruits] : no carotid bruits [No Murmur] : no murmur heard [No Abdominal Bruit] : a ~M bruit was not heard ~T in the abdomen [No Varicosities] : no varicosities [Pedal Pulses Present] : the pedal pulses are present [No Edema] : there was no peripheral edema [No Palpable Aorta] : no palpable aorta [No Extremity Clubbing/Cyanosis] : no extremity clubbing/cyanosis [Soft] : abdomen soft [Non Tender] : non-tender [Non-distended] : non-distended [No Masses] : no abdominal mass palpated [No HSM] : no HSM [Normal Bowel Sounds] : normal bowel sounds [Normal Anterior Cervical Nodes] : no anterior cervical lymphadenopathy [No CVA Tenderness] : no CVA  tenderness [No Spinal Tenderness] : no spinal tenderness [No Joint Swelling] : no joint swelling [Grossly Normal Strength/Tone] : grossly normal strength/tone [No Rash] : no rash [Coordination Grossly Intact] : coordination grossly intact [No Focal Deficits] : no focal deficits [Normal Affect] : the affect was normal [Normal Insight/Judgement] : insight and judgment were intact

## 2021-09-14 ENCOUNTER — RX CHANGE (OUTPATIENT)
Age: 41
End: 2021-09-14

## 2021-09-24 LAB
CHOLEST SERPL-MCNC: 165 MG/DL
HDLC SERPL-MCNC: 53 MG/DL
LDLC SERPL CALC-MCNC: 83 MG/DL
NONHDLC SERPL-MCNC: 112 MG/DL
TRIGL SERPL-MCNC: 144 MG/DL

## 2021-10-12 ENCOUNTER — RX RENEWAL (OUTPATIENT)
Age: 41
End: 2021-10-12

## 2021-11-03 ENCOUNTER — APPOINTMENT (OUTPATIENT)
Dept: INTERNAL MEDICINE | Facility: CLINIC | Age: 41
End: 2021-11-03
Payer: COMMERCIAL

## 2021-11-03 VITALS
SYSTOLIC BLOOD PRESSURE: 141 MMHG | BODY MASS INDEX: 30.88 KG/M2 | HEART RATE: 89 BPM | TEMPERATURE: 98 F | WEIGHT: 233 LBS | DIASTOLIC BLOOD PRESSURE: 93 MMHG | HEIGHT: 73 IN | OXYGEN SATURATION: 97 %

## 2021-11-03 PROCEDURE — 99213 OFFICE O/P EST LOW 20 MIN: CPT

## 2021-11-03 RX ORDER — KETOROLAC TROMETHAMINE 10 MG/1
10 TABLET, FILM COATED ORAL 3 TIMES DAILY
Qty: 15 | Refills: 0 | Status: DISCONTINUED | COMMUNITY
Start: 2021-03-05 | End: 2021-11-03

## 2021-11-03 NOTE — PHYSICAL EXAM
[Normal] : no jugular venous distention, supple, no lymphadenopathy and the thyroid was normal and there were no nodules present [No Respiratory Distress] : no respiratory distress  [Normal Rate] : normal rate  [Regular Rhythm] : with a regular rhythm [de-identified] : DECREASED ROM DUE TO PAIN OF LEFT SHOULDER ,NO ERYTHEMA ,PASSIVE ROTATION AND ABDUCTION LIMITED BY PAIN

## 2021-11-03 NOTE — ASSESSMENT
[FreeTextEntry1] : 41 Y OLD MALE PRESENTS WITH ACUTE LEFT SHOULDER ARTHRALGIA AND MUSCLE SPASM = FLEXERIL AND MOBIC RX SENT ;XR LEFT SHOULDER ORDERED

## 2021-11-03 NOTE — HISTORY OF PRESENT ILLNESS
[FreeTextEntry8] : AFTER TAKING GIRLFRIEND TO THE AIRPORT AND WHILE RESTING AT HOME HOURS LATER START DEVELOPING LEFT SHOULDER PAIN TAHT GOT WORSE OVERNIGHT ;DENIES ANY TRAUMA OR HEAVY LIFTING \par PAINS IS BETTER AFTER ICE AND OTC PATCH

## 2021-11-08 ENCOUNTER — NON-APPOINTMENT (OUTPATIENT)
Age: 41
End: 2021-11-08

## 2021-11-09 ENCOUNTER — RX RENEWAL (OUTPATIENT)
Age: 41
End: 2021-11-09

## 2021-11-10 ENCOUNTER — RX CHANGE (OUTPATIENT)
Age: 41
End: 2021-11-10

## 2021-12-01 ENCOUNTER — APPOINTMENT (OUTPATIENT)
Dept: INTERNAL MEDICINE | Facility: CLINIC | Age: 41
End: 2021-12-01
Payer: COMMERCIAL

## 2021-12-01 VITALS
SYSTOLIC BLOOD PRESSURE: 152 MMHG | BODY MASS INDEX: 30.22 KG/M2 | DIASTOLIC BLOOD PRESSURE: 92 MMHG | WEIGHT: 228 LBS | TEMPERATURE: 98 F | HEART RATE: 87 BPM | HEIGHT: 73 IN | OXYGEN SATURATION: 97 %

## 2021-12-01 VITALS — SYSTOLIC BLOOD PRESSURE: 130 MMHG | DIASTOLIC BLOOD PRESSURE: 80 MMHG

## 2021-12-01 LAB
ESTIMATED AVERAGE GLUCOSE: 117 MG/DL
HBA1C MFR BLD HPLC: 5.7 %

## 2021-12-01 PROCEDURE — 99214 OFFICE O/P EST MOD 30 MIN: CPT

## 2021-12-01 RX ORDER — CYCLOBENZAPRINE HYDROCHLORIDE 10 MG/1
10 TABLET, FILM COATED ORAL
Qty: 10 | Refills: 0 | Status: DISCONTINUED | COMMUNITY
Start: 2021-11-03 | End: 2021-12-01

## 2021-12-01 NOTE — ASSESSMENT
[FreeTextEntry1] : 41 Y OLD MALE WITH PMX OF HTN ,DM AND DYSLIPIDEMIA = LABS AND RX ORDERED\par R CERUMEN IMPACTION = ENT EVAL \par MILD INTERMITTENT ANXIETY = ALPRAZOLAM 0.5 MG DAILY PRN #10\par RECOMM COVID-19 BOOSTER AFTER 6M (1/22)\par RTO 3 M

## 2021-12-01 NOTE — PHYSICAL EXAM
[No Acute Distress] : no acute distress [Well Nourished] : well nourished [Well Developed] : well developed [Well-Appearing] : well-appearing [Normal Sclera/Conjunctiva] : normal sclera/conjunctiva [PERRL] : pupils equal round and reactive to light [EOMI] : extraocular movements intact [Normal Oropharynx] : the oropharynx was normal [No JVD] : no jugular venous distention [No Lymphadenopathy] : no lymphadenopathy [Supple] : supple [Thyroid Normal, No Nodules] : the thyroid was normal and there were no nodules present [No Respiratory Distress] : no respiratory distress  [No Accessory Muscle Use] : no accessory muscle use [Clear to Auscultation] : lungs were clear to auscultation bilaterally [Normal Rate] : normal rate  [Regular Rhythm] : with a regular rhythm [Normal S1, S2] : normal S1 and S2 [No Murmur] : no murmur heard [No Carotid Bruits] : no carotid bruits [No Abdominal Bruit] : a ~M bruit was not heard ~T in the abdomen [No Varicosities] : no varicosities [Pedal Pulses Present] : the pedal pulses are present [No Edema] : there was no peripheral edema [No Palpable Aorta] : no palpable aorta [No Extremity Clubbing/Cyanosis] : no extremity clubbing/cyanosis [Soft] : abdomen soft [Non Tender] : non-tender [Non-distended] : non-distended [No Masses] : no abdominal mass palpated [No HSM] : no HSM [Normal Bowel Sounds] : normal bowel sounds [Normal Posterior Cervical Nodes] : no posterior cervical lymphadenopathy [Normal Anterior Cervical Nodes] : no anterior cervical lymphadenopathy [No CVA Tenderness] : no CVA  tenderness [No Spinal Tenderness] : no spinal tenderness [No Joint Swelling] : no joint swelling [Grossly Normal Strength/Tone] : grossly normal strength/tone [No Rash] : no rash [Coordination Grossly Intact] : coordination grossly intact [No Focal Deficits] : no focal deficits [Normal Affect] : the affect was normal [Normal Insight/Judgement] : insight and judgment were intact [Anxious] : anxious [de-identified] : R EAR CANAL CERUMEN IMPACTION

## 2021-12-01 NOTE — HISTORY OF PRESENT ILLNESS
[de-identified] : PT COMES FOR F/U HTN ,DYSLIPIDEMIA AND DM ,COMPLAINT WITH MEDS\par WENT BACK TO SMOKING DUE TO STRESS ,QUIT 12 DAYS AGO BUT WITH SPORADIC INCREASED ANXIETY EPISODES\par OTC NICOTINE REPLACEMENTS DO NOT HELP HIM

## 2021-12-03 LAB
ALBUMIN SERPL ELPH-MCNC: 5 G/DL
ALP BLD-CCNC: 82 U/L
ALT SERPL-CCNC: 38 U/L
ANION GAP SERPL CALC-SCNC: 13 MMOL/L
AST SERPL-CCNC: 26 U/L
BILIRUB SERPL-MCNC: 0.3 MG/DL
BUN SERPL-MCNC: 13 MG/DL
CALCIUM SERPL-MCNC: 10.5 MG/DL
CHLORIDE SERPL-SCNC: 101 MMOL/L
CHOLEST SERPL-MCNC: 169 MG/DL
CO2 SERPL-SCNC: 26 MMOL/L
CREAT SERPL-MCNC: 0.82 MG/DL
GLUCOSE SERPL-MCNC: 102 MG/DL
HDLC SERPL-MCNC: 54 MG/DL
LDLC SERPL CALC-MCNC: 85 MG/DL
NONHDLC SERPL-MCNC: 115 MG/DL
POTASSIUM SERPL-SCNC: 5.2 MMOL/L
PROT SERPL-MCNC: 7.8 G/DL
SODIUM SERPL-SCNC: 140 MMOL/L
TRIGL SERPL-MCNC: 150 MG/DL

## 2022-02-03 ENCOUNTER — TRANSCRIPTION ENCOUNTER (OUTPATIENT)
Age: 42
End: 2022-02-03

## 2022-02-03 ENCOUNTER — APPOINTMENT (OUTPATIENT)
Dept: HEART AND VASCULAR | Facility: CLINIC | Age: 42
End: 2022-02-03

## 2022-02-19 ENCOUNTER — RX RENEWAL (OUTPATIENT)
Age: 42
End: 2022-02-19

## 2022-03-23 ENCOUNTER — RX CHANGE (OUTPATIENT)
Age: 42
End: 2022-03-23

## 2022-03-23 ENCOUNTER — APPOINTMENT (OUTPATIENT)
Dept: INTERNAL MEDICINE | Facility: CLINIC | Age: 42
End: 2022-03-23
Payer: COMMERCIAL

## 2022-03-23 ENCOUNTER — NON-APPOINTMENT (OUTPATIENT)
Age: 42
End: 2022-03-23

## 2022-03-23 VITALS
SYSTOLIC BLOOD PRESSURE: 156 MMHG | BODY MASS INDEX: 31.14 KG/M2 | OXYGEN SATURATION: 95 % | TEMPERATURE: 97.9 F | HEART RATE: 99 BPM | HEIGHT: 73 IN | WEIGHT: 235 LBS | DIASTOLIC BLOOD PRESSURE: 96 MMHG

## 2022-03-23 VITALS — SYSTOLIC BLOOD PRESSURE: 142 MMHG | DIASTOLIC BLOOD PRESSURE: 90 MMHG

## 2022-03-23 PROCEDURE — 93000 ELECTROCARDIOGRAM COMPLETE: CPT

## 2022-03-23 PROCEDURE — 99213 OFFICE O/P EST LOW 20 MIN: CPT

## 2022-03-23 PROCEDURE — 99396 PREV VISIT EST AGE 40-64: CPT

## 2022-03-23 PROCEDURE — 99406 BEHAV CHNG SMOKING 3-10 MIN: CPT

## 2022-03-23 RX ORDER — ALPRAZOLAM 0.5 MG/1
0.5 TABLET ORAL
Qty: 10 | Refills: 0 | Status: DISCONTINUED | COMMUNITY
Start: 2021-12-01 | End: 2022-03-23

## 2022-03-23 RX ORDER — CLOBETASOL PROPIONATE 0.5 MG/ML
0.05 SOLUTION TOPICAL DAILY
Qty: 1 | Refills: 0 | Status: DISCONTINUED | COMMUNITY
Start: 2021-02-10 | End: 2022-03-23

## 2022-03-23 NOTE — HISTORY OF PRESENT ILLNESS
[de-identified] : COMES FOR F/U STILL SMOKING FEW CIG MOST DAYS \par HECTIC DIET AND EXERCISE LAST 6 WEEKS DUE TO WORK AND STUDYING

## 2022-03-23 NOTE — PHYSICAL EXAM
[No Acute Distress] : no acute distress [Well Nourished] : well nourished [Well Developed] : well developed [Well-Appearing] : well-appearing [Normal Sclera/Conjunctiva] : normal sclera/conjunctiva [PERRL] : pupils equal round and reactive to light [EOMI] : extraocular movements intact [No JVD] : no jugular venous distention [No Lymphadenopathy] : no lymphadenopathy [Supple] : supple [Thyroid Normal, No Nodules] : the thyroid was normal and there were no nodules present [No Respiratory Distress] : no respiratory distress  [No Accessory Muscle Use] : no accessory muscle use [Normal Rate] : normal rate  [Clear to Auscultation] : lungs were clear to auscultation bilaterally [Regular Rhythm] : with a regular rhythm [Normal S1, S2] : normal S1 and S2 [No Murmur] : no murmur heard [No Carotid Bruits] : no carotid bruits [No Abdominal Bruit] : a ~M bruit was not heard ~T in the abdomen [No Varicosities] : no varicosities [Pedal Pulses Present] : the pedal pulses are present [No Edema] : there was no peripheral edema [No Palpable Aorta] : no palpable aorta [No Extremity Clubbing/Cyanosis] : no extremity clubbing/cyanosis [Soft] : abdomen soft [Non-distended] : non-distended [Non Tender] : non-tender [No Masses] : no abdominal mass palpated [No HSM] : no HSM [Normal Bowel Sounds] : normal bowel sounds [Obese] : obese [Normal Posterior Cervical Nodes] : no posterior cervical lymphadenopathy [Normal Anterior Cervical Nodes] : no anterior cervical lymphadenopathy [No CVA Tenderness] : no CVA  tenderness [No Spinal Tenderness] : no spinal tenderness [No Joint Swelling] : no joint swelling [Grossly Normal Strength/Tone] : grossly normal strength/tone [No Rash] : no rash [Coordination Grossly Intact] : coordination grossly intact [No Focal Deficits] : no focal deficits [Normal Affect] : the affect was normal [Normal Insight/Judgement] : insight and judgment were intact

## 2022-03-23 NOTE — ASSESSMENT
[FreeTextEntry1] : CPE OF 41 Y OLD MALE WITH PMX OF HTN ,DM ,DYSLIPIDEMIA AND OBESITY = LABS AND EKG SMOKING CESSATION = BUPROPION 150 MG RX SENT ,DISCUSSED WITHDRAWAL AND SIDE EFFECTS \par OBESITY = DISCUSSED \par RTO 2 M

## 2022-03-23 NOTE — COUNSELING
[Cessation strategies including cessation program discussed] : Cessation strategies including cessation program discussed [Encouraged to pick a quit date and identify support needed to quit] : Encouraged to pick a quit date and identify support needed to quit [FreeTextEntry1] : 7 [Benefits of weight loss discussed] : Benefits of weight loss discussed [Encouraged to increase physical activity] : Encouraged to increase physical activity

## 2022-03-24 ENCOUNTER — RX CHANGE (OUTPATIENT)
Age: 42
End: 2022-03-24

## 2022-03-24 LAB
25(OH)D3 SERPL-MCNC: 32.1 NG/ML
ALBUMIN SERPL ELPH-MCNC: 5.1 G/DL
ALP BLD-CCNC: 83 U/L
ALT SERPL-CCNC: 54 U/L
ANION GAP SERPL CALC-SCNC: 17 MMOL/L
APPEARANCE: CLEAR
AST SERPL-CCNC: 38 U/L
BASOPHILS # BLD AUTO: 0.1 K/UL
BASOPHILS NFR BLD AUTO: 1 %
BILIRUB SERPL-MCNC: 0.3 MG/DL
BILIRUBIN URINE: NEGATIVE
BLOOD URINE: NEGATIVE
BUN SERPL-MCNC: 8 MG/DL
CALCIUM SERPL-MCNC: 10.5 MG/DL
CHLORIDE SERPL-SCNC: 102 MMOL/L
CHOLEST SERPL-MCNC: 179 MG/DL
CO2 SERPL-SCNC: 21 MMOL/L
COLOR: NORMAL
CREAT SERPL-MCNC: 0.71 MG/DL
CREAT SPEC-SCNC: 117 MG/DL
EGFR: 117 ML/MIN/1.73M2
EOSINOPHIL # BLD AUTO: 0.33 K/UL
EOSINOPHIL NFR BLD AUTO: 3.3 %
ESTIMATED AVERAGE GLUCOSE: 120 MG/DL
GLUCOSE QUALITATIVE U: NEGATIVE
GLUCOSE SERPL-MCNC: 125 MG/DL
HBA1C MFR BLD HPLC: 5.8 %
HCT VFR BLD CALC: 50.8 %
HDLC SERPL-MCNC: 50 MG/DL
HGB BLD-MCNC: 16.5 G/DL
IMM GRANULOCYTES NFR BLD AUTO: 0.2 %
KETONES URINE: NEGATIVE
LDLC SERPL CALC-MCNC: 90 MG/DL
LEUKOCYTE ESTERASE URINE: NEGATIVE
LYMPHOCYTES # BLD AUTO: 3.35 K/UL
LYMPHOCYTES NFR BLD AUTO: 33.9 %
MAN DIFF?: NORMAL
MCHC RBC-ENTMCNC: 29.4 PG
MCHC RBC-ENTMCNC: 32.5 GM/DL
MCV RBC AUTO: 90.4 FL
MICROALBUMIN 24H UR DL<=1MG/L-MCNC: <1.2 MG/DL
MICROALBUMIN/CREAT 24H UR-RTO: NORMAL MG/G
MONOCYTES # BLD AUTO: 0.82 K/UL
MONOCYTES NFR BLD AUTO: 8.3 %
NEUTROPHILS # BLD AUTO: 5.27 K/UL
NEUTROPHILS NFR BLD AUTO: 53.3 %
NITRITE URINE: NEGATIVE
NONHDLC SERPL-MCNC: 129 MG/DL
PH URINE: 5.5
PLATELET # BLD AUTO: 433 K/UL
POTASSIUM SERPL-SCNC: 4 MMOL/L
PROT SERPL-MCNC: 7.8 G/DL
PROTEIN URINE: NEGATIVE
PSA FREE FLD-MCNC: 40 %
PSA FREE SERPL-MCNC: 0.17 NG/ML
PSA SERPL-MCNC: 0.41 NG/ML
RBC # BLD: 5.62 M/UL
RBC # FLD: 12.6 %
SODIUM SERPL-SCNC: 140 MMOL/L
SPECIFIC GRAVITY URINE: 1.01
TRIGL SERPL-MCNC: 193 MG/DL
TSH SERPL-ACNC: 1.05 UIU/ML
UROBILINOGEN URINE: NORMAL
VIT B12 SERPL-MCNC: 390 PG/ML
WBC # FLD AUTO: 9.89 K/UL

## 2022-03-29 ENCOUNTER — RX RENEWAL (OUTPATIENT)
Age: 42
End: 2022-03-29

## 2022-03-29 ENCOUNTER — APPOINTMENT (OUTPATIENT)
Dept: HEART AND VASCULAR | Facility: CLINIC | Age: 42
End: 2022-03-29
Payer: COMMERCIAL

## 2022-03-29 VITALS
SYSTOLIC BLOOD PRESSURE: 147 MMHG | WEIGHT: 235 LBS | DIASTOLIC BLOOD PRESSURE: 96 MMHG | HEIGHT: 73 IN | BODY MASS INDEX: 31.14 KG/M2 | OXYGEN SATURATION: 98 % | TEMPERATURE: 98 F | HEART RATE: 98 BPM

## 2022-03-29 VITALS — DIASTOLIC BLOOD PRESSURE: 88 MMHG | SYSTOLIC BLOOD PRESSURE: 142 MMHG

## 2022-03-29 PROCEDURE — 99214 OFFICE O/P EST MOD 30 MIN: CPT

## 2022-03-29 NOTE — ASSESSMENT
[FreeTextEntry1] : 42 yo M PMH HTN, HLD, DM here for follow up\par \par \par HTN \par -Borderline high today\par -cont with  amlodipine-olmesartan 5-40 mg daily and keep BP log at home\par -recc to keep BP log at home. Will call in one week to review BP log, if  if BP> 140/90 will increase amlodipine-olmeartan to 10-40\par -echo 2/19/2021 EF 65% \par -Cr 0.71 on 3/24/2022\par -low salt diet advised\par \par HLD\par -LDL 90 on 3/24/2022\par -Continue with rosuvastatin 5 mg daily\par -recc healthy diet and exercise \par  \par Nicotine Addiction\par -advised on smoking cessation for 5 minutes\par \par \par f/u via telephone in one week for BP check\par f/u in 6 months or sooner if any symptoms

## 2022-03-29 NOTE — HISTORY OF PRESENT ILLNESS
[FreeTextEntry1] : 40 yo M PMH HTN, HLD, DM here for follow up visit \par \par Patient reports feeling well\par Denies CP/SOB/palpitations/syncope/presyncope/LE edema\par Reports excellent ET; can run 5 miles without stopping\par Compliant with meds.\par Reports he has not been checking home BP's the past two months and he also started smoking a few cigarettes a day 2/2 stress at work \par \par \par PMH\par HTN\par HLD\par DM\par \par PSH\par meniscus repair\par \par ALL\par NKDA\par \par MEDS\par amlodipine-olmesartan  5-40 mg daily \par rosuvastatin 5 mg daily \par \par SH\par smokes 2-3 cigarettes a day, social etoh, no drugs\par works for dept of [employer] \par \par EKG 3/23/2022 NSR, wnl \par EKG 1/27/2021 SR,wnl\par \par EXERCISE STRESS TEST ([facility]) 9/20/2018\par 8:40 Aly protocol, 91% MPHR \par negative stress test \par \par ECHO 2/16/2021\par EF 60-65% \par \par Labs 3/23/2022\par Cr 0.71\par Tri 193\par Chol 179\par HDL 50\par LDL 90\par \par Labs 5/26/2021\par Glycerides 75\par Cholesterol 160\par HDL 51\par LDL 93\par CMP within normal limits\par \par

## 2022-04-30 ENCOUNTER — TRANSCRIPTION ENCOUNTER (OUTPATIENT)
Age: 42
End: 2022-04-30

## 2022-05-11 ENCOUNTER — TRANSCRIPTION ENCOUNTER (OUTPATIENT)
Age: 42
End: 2022-05-11

## 2022-07-11 ENCOUNTER — APPOINTMENT (OUTPATIENT)
Dept: INTERNAL MEDICINE | Facility: CLINIC | Age: 42
End: 2022-07-11

## 2022-07-11 VITALS
RESPIRATION RATE: 14 BRPM | WEIGHT: 236 LBS | OXYGEN SATURATION: 97 % | DIASTOLIC BLOOD PRESSURE: 81 MMHG | BODY MASS INDEX: 31.28 KG/M2 | HEIGHT: 73 IN | HEART RATE: 121 BPM | SYSTOLIC BLOOD PRESSURE: 138 MMHG

## 2022-07-11 DIAGNOSIS — F17.213 NICOTINE DEPENDENCE, CIGARETTES, WITH WITHDRAWAL: ICD-10-CM

## 2022-07-11 PROCEDURE — 99406 BEHAV CHNG SMOKING 3-10 MIN: CPT

## 2022-07-11 PROCEDURE — 99214 OFFICE O/P EST MOD 30 MIN: CPT | Mod: 25

## 2022-07-11 RX ORDER — BUPROPION HYDROCHLORIDE 150 MG/1
150 TABLET, FILM COATED, EXTENDED RELEASE ORAL
Qty: 180 | Refills: 0 | Status: DISCONTINUED | COMMUNITY
Start: 2022-03-23 | End: 2022-07-11

## 2022-07-11 NOTE — ASSESSMENT
[FreeTextEntry1] : 42 Y OLD MALE WITH PMX OF DM ,DYSLIPIDEMIA ,HTN NAD OBESITY = LABS ORDERED,MEDS RX SENT \par SMOKING CESSATION COUNSELING AND CHANTIX DISCUSSED AND RX \par RTO 3 M OR PRN \par RECOMM 4TH COVID-19 VACCINE

## 2022-07-11 NOTE — HISTORY OF PRESENT ILLNESS
[de-identified] : COMES FOR F/U \par OUT OF ROSUVASTATIN FOR 1 M \par HAD DIARRHEA FOR 1 WEEK 3 WEEKS AGO AFTER EATING PIZZA \par HAD 3ER COVID-19 VACCINE 1/22\par STILL SMOKING ,BUPROPION CUASED SEVERE HA AND STOP AFTER 1 WEEK \par IN THE PAST NICOTINE PATCH GAVE HIM HALLUCINATIONS

## 2022-07-11 NOTE — COUNSELING
[Cessation strategies including cessation program discussed] : Cessation strategies including cessation program discussed [Use of nicotine replacement therapies and other medications discussed] : Use of nicotine replacement therapies and other medications discussed [Smoking Cessation Program Referral] : Smoking Cessation Program Referral  [FreeTextEntry1] : 8 [Benefits of weight loss discussed] : Benefits of weight loss discussed [Encouraged to increase physical activity] : Encouraged to increase physical activity

## 2022-08-03 ENCOUNTER — RX CHANGE (OUTPATIENT)
Age: 42
End: 2022-08-03

## 2022-08-10 ENCOUNTER — NON-APPOINTMENT (OUTPATIENT)
Age: 42
End: 2022-08-10

## 2022-08-12 ENCOUNTER — RX RENEWAL (OUTPATIENT)
Age: 42
End: 2022-08-12

## 2022-08-17 ENCOUNTER — APPOINTMENT (OUTPATIENT)
Dept: HEART AND VASCULAR | Facility: CLINIC | Age: 42
End: 2022-08-17

## 2022-08-17 LAB
ALBUMIN SERPL ELPH-MCNC: 5 G/DL
ALP BLD-CCNC: 78 U/L
ALT SERPL-CCNC: 55 U/L
ANION GAP SERPL CALC-SCNC: 12 MMOL/L
AST SERPL-CCNC: 40 U/L
BILIRUB SERPL-MCNC: 0.4 MG/DL
BUN SERPL-MCNC: 11 MG/DL
CALCIUM SERPL-MCNC: 10.7 MG/DL
CHLORIDE SERPL-SCNC: 103 MMOL/L
CO2 SERPL-SCNC: 27 MMOL/L
CREAT SERPL-MCNC: 0.83 MG/DL
EGFR: 111 ML/MIN/1.73M2
ESTIMATED AVERAGE GLUCOSE: 123 MG/DL
GLUCOSE SERPL-MCNC: 112 MG/DL
HBA1C MFR BLD HPLC: 5.9 %
POTASSIUM SERPL-SCNC: 5.1 MMOL/L
PROT SERPL-MCNC: 7.8 G/DL
SODIUM SERPL-SCNC: 142 MMOL/L

## 2022-08-17 PROCEDURE — 36415 COLL VENOUS BLD VENIPUNCTURE: CPT

## 2022-08-18 LAB
CHOLEST SERPL-MCNC: 181 MG/DL
HDLC SERPL-MCNC: 52 MG/DL
LDLC SERPL CALC-MCNC: 100 MG/DL
NONHDLC SERPL-MCNC: 129 MG/DL
TRIGL SERPL-MCNC: 146 MG/DL

## 2022-08-19 ENCOUNTER — NON-APPOINTMENT (OUTPATIENT)
Age: 42
End: 2022-08-19

## 2022-10-21 ENCOUNTER — RX RENEWAL (OUTPATIENT)
Age: 42
End: 2022-10-21

## 2022-11-07 ENCOUNTER — APPOINTMENT (OUTPATIENT)
Dept: INTERNAL MEDICINE | Facility: CLINIC | Age: 42
End: 2022-11-07

## 2022-11-07 VITALS
TEMPERATURE: 98.1 F | HEART RATE: 97 BPM | OXYGEN SATURATION: 98 % | RESPIRATION RATE: 14 BRPM | SYSTOLIC BLOOD PRESSURE: 135 MMHG | WEIGHT: 245 LBS | HEIGHT: 73 IN | BODY MASS INDEX: 32.47 KG/M2 | DIASTOLIC BLOOD PRESSURE: 85 MMHG

## 2022-11-07 DIAGNOSIS — Z23 ENCOUNTER FOR IMMUNIZATION: ICD-10-CM

## 2022-11-07 DIAGNOSIS — H61.21 IMPACTED CERUMEN, RIGHT EAR: ICD-10-CM

## 2022-11-07 PROCEDURE — 99214 OFFICE O/P EST MOD 30 MIN: CPT | Mod: 25

## 2022-11-07 PROCEDURE — 90686 IIV4 VACC NO PRSV 0.5 ML IM: CPT

## 2022-11-07 PROCEDURE — 90471 IMMUNIZATION ADMIN: CPT

## 2022-11-07 RX ORDER — VARENICLINE TARTRATE 25 MG
0.5 MG X 11 & KIT ORAL
Qty: 60 | Refills: 0 | Status: DISCONTINUED | COMMUNITY
Start: 2022-07-11

## 2022-11-07 RX ORDER — VARENICLINE 1 MG/1
1 TABLET, FILM COATED ORAL
Qty: 60 | Refills: 0 | Status: DISCONTINUED | COMMUNITY
Start: 2022-07-11 | End: 2022-11-07

## 2022-11-07 RX ORDER — VARENICLINE TARTRATE 25 MG
0.5 MG X 11 & KIT ORAL
Qty: 60 | Refills: 2 | Status: DISCONTINUED | COMMUNITY
Start: 2022-07-11 | End: 2022-11-07

## 2022-11-07 NOTE — ASSESSMENT
[FreeTextEntry1] : 42 Y OLD MALE WITH PMX OF HTN ,DM ,DYSLIPIDEMIA AND OBESITY = LABS AND RX ORDERED\par CIG SMOKING = 1-3 CIG A DAY MOST DAYS = SMOKING CESSATION COUNSELING \par DISCUSSED COLONOSCOPY AT AGE 43 \par RTO 3 M

## 2022-11-08 LAB
ALBUMIN SERPL ELPH-MCNC: 4.7 G/DL
ALP BLD-CCNC: 72 U/L
ALT SERPL-CCNC: 54 U/L
ANION GAP SERPL CALC-SCNC: 15 MMOL/L
AST SERPL-CCNC: 52 U/L
BILIRUB SERPL-MCNC: 0.4 MG/DL
BUN SERPL-MCNC: 13 MG/DL
CALCIUM SERPL-MCNC: 10.2 MG/DL
CHLORIDE SERPL-SCNC: 101 MMOL/L
CHOLEST SERPL-MCNC: 180 MG/DL
CO2 SERPL-SCNC: 23 MMOL/L
CREAT SERPL-MCNC: 0.85 MG/DL
EGFR: 110 ML/MIN/1.73M2
ESTIMATED AVERAGE GLUCOSE: 131 MG/DL
GLUCOSE SERPL-MCNC: 70 MG/DL
HBA1C MFR BLD HPLC: 6.2 %
HDLC SERPL-MCNC: 51 MG/DL
LDLC SERPL CALC-MCNC: 102 MG/DL
NONHDLC SERPL-MCNC: 129 MG/DL
POTASSIUM SERPL-SCNC: 4.9 MMOL/L
PROT SERPL-MCNC: 7.6 G/DL
SODIUM SERPL-SCNC: 140 MMOL/L
TRIGL SERPL-MCNC: 133 MG/DL

## 2022-12-28 ENCOUNTER — APPOINTMENT (OUTPATIENT)
Dept: INTERNAL MEDICINE | Facility: CLINIC | Age: 42
End: 2022-12-28

## 2022-12-28 VITALS
DIASTOLIC BLOOD PRESSURE: 98 MMHG | WEIGHT: 251 LBS | HEART RATE: 96 BPM | TEMPERATURE: 99.1 F | BODY MASS INDEX: 33.27 KG/M2 | RESPIRATION RATE: 14 BRPM | OXYGEN SATURATION: 97 % | SYSTOLIC BLOOD PRESSURE: 149 MMHG | HEIGHT: 73 IN

## 2022-12-28 VITALS — DIASTOLIC BLOOD PRESSURE: 95 MMHG | SYSTOLIC BLOOD PRESSURE: 150 MMHG

## 2022-12-28 DIAGNOSIS — M25.512 PAIN IN LEFT SHOULDER: ICD-10-CM

## 2022-12-28 PROCEDURE — 99213 OFFICE O/P EST LOW 20 MIN: CPT

## 2022-12-28 NOTE — ASSESSMENT
[FreeTextEntry1] : 42 Y OLD MALE PRESENTS WITH ACUTE ONSET - RECURRENCE OF LEFT SHOULDER PAIN = MELOXICAM 15 MG RX \par RTO IF NOT BETTER IN 7-10 DAYS \par HTN = MONITOR BP AT HOME ;RTO IF ABOVE 140/90

## 2022-12-28 NOTE — HISTORY OF PRESENT ILLNESS
[FreeTextEntry8] : CC OF LEFT SHOULDER PAIN AFTRE SLEEPING ON SOFA ON DOMINIC KENIA;SIMILAR EPISODE 1 Y AGO WHEN SLEPT ON SOFA AS WELL

## 2022-12-28 NOTE — PHYSICAL EXAM
[Normal] : no jugular venous distention, supple, no lymphadenopathy and the thyroid was normal and there were no nodules present [No Respiratory Distress] : no respiratory distress  [Normal Rate] : normal rate  [No Edema] : there was no peripheral edema [de-identified] : TENDER LEFT SHOULDER AND PAINFUL ROM LEFT SHOULDER

## 2023-02-08 ENCOUNTER — APPOINTMENT (OUTPATIENT)
Dept: INTERNAL MEDICINE | Facility: CLINIC | Age: 43
End: 2023-02-08
Payer: COMMERCIAL

## 2023-02-08 VITALS
HEART RATE: 97 BPM | BODY MASS INDEX: 32.6 KG/M2 | DIASTOLIC BLOOD PRESSURE: 79 MMHG | RESPIRATION RATE: 16 BRPM | OXYGEN SATURATION: 98 % | TEMPERATURE: 98 F | WEIGHT: 246 LBS | SYSTOLIC BLOOD PRESSURE: 143 MMHG | HEIGHT: 73 IN

## 2023-02-08 DIAGNOSIS — L29.9 PRURITUS, UNSPECIFIED: ICD-10-CM

## 2023-02-08 LAB
ALBUMIN SERPL ELPH-MCNC: 4.9 G/DL
ALP BLD-CCNC: 79 U/L
ALT SERPL-CCNC: 65 U/L
ANION GAP SERPL CALC-SCNC: 14 MMOL/L
AST SERPL-CCNC: 58 U/L
BILIRUB SERPL-MCNC: 0.5 MG/DL
BUN SERPL-MCNC: 12 MG/DL
CALCIUM SERPL-MCNC: 10.5 MG/DL
CHLORIDE SERPL-SCNC: 98 MMOL/L
CO2 SERPL-SCNC: 25 MMOL/L
CREAT SERPL-MCNC: 0.84 MG/DL
EGFR: 110 ML/MIN/1.73M2
GLUCOSE SERPL-MCNC: 91 MG/DL
POTASSIUM SERPL-SCNC: 4.8 MMOL/L
PROT SERPL-MCNC: 7.9 G/DL
SODIUM SERPL-SCNC: 137 MMOL/L

## 2023-02-08 PROCEDURE — 99214 OFFICE O/P EST MOD 30 MIN: CPT

## 2023-02-08 RX ORDER — MELOXICAM 15 MG/1
15 TABLET ORAL DAILY
Qty: 15 | Refills: 0 | Status: DISCONTINUED | COMMUNITY
Start: 2022-12-28 | End: 2023-02-08

## 2023-02-08 RX ORDER — VARENICLINE 1 MG/1
1 TABLET, FILM COATED ORAL
Qty: 60 | Refills: 2 | Status: DISCONTINUED | COMMUNITY
Start: 2022-08-12 | End: 2023-02-08

## 2023-02-08 NOTE — PHYSICAL EXAM
[No Acute Distress] : no acute distress [Obese] : obese [Normal] : normal [Soft, Nontender] : the abdomen was soft and nontender [No Mass] : no masses were palpated [No HSM] : no hepatosplenomegaly noted [No Rash] : no rash [Alert and Oriented x3] : oriented to person, place, and time [de-identified] : MINIMAL CERUMEN BOTH EAR CANALS

## 2023-02-08 NOTE — ASSESSMENT
[FreeTextEntry1] : 42 Y OLD MALE WITH PMX OF HTN WITH HOME BP WELL CONTROL \par DM AND DYSLIPIDEMIA = LABS \par SCALP DERMATITIS= CLOBETASOL SOL \par RTO 3 M

## 2023-02-08 NOTE — HISTORY OF PRESENT ILLNESS
[de-identified] : COMES FOR F/U HTN ,DM ,DYSLIPIDEMIA AND OBESITY \par HAD QUIT SMOKING 1/1/23 "COLD TURKEY"\par SEEN BY OPHTHA ,DX WITH DRY EYES\par SEEN BY ENT WITH EAR LAVAGE 2 M AGO

## 2023-02-09 LAB
ESTIMATED AVERAGE GLUCOSE: 134 MG/DL
HBA1C MFR BLD HPLC: 6.3 %

## 2023-02-12 ENCOUNTER — RX RENEWAL (OUTPATIENT)
Age: 43
End: 2023-02-12

## 2023-02-13 RX ORDER — CLOBETASOL PROPIONATE 0.05 G/100ML
0.05 LOTION TOPICAL TWICE DAILY
Qty: 1 | Refills: 1 | Status: ACTIVE | COMMUNITY
Start: 2023-02-08 | End: 1900-01-01

## 2023-02-14 LAB
CHOLEST SERPL-MCNC: 192 MG/DL
HDLC SERPL-MCNC: 53 MG/DL
LDLC SERPL CALC-MCNC: 108 MG/DL
NONHDLC SERPL-MCNC: 139 MG/DL
TRIGL SERPL-MCNC: 159 MG/DL

## 2023-02-16 ENCOUNTER — RX RENEWAL (OUTPATIENT)
Age: 43
End: 2023-02-16

## 2023-03-19 ENCOUNTER — RX CHANGE (OUTPATIENT)
Age: 43
End: 2023-03-19

## 2023-03-20 ENCOUNTER — TRANSCRIPTION ENCOUNTER (OUTPATIENT)
Age: 43
End: 2023-03-20

## 2023-04-05 PROBLEM — G72.0 STATIN MYOPATHY: Status: ACTIVE | Noted: 2021-02-25

## 2023-04-19 ENCOUNTER — APPOINTMENT (OUTPATIENT)
Dept: INTERNAL MEDICINE | Facility: CLINIC | Age: 43
End: 2023-04-19
Payer: COMMERCIAL

## 2023-04-19 ENCOUNTER — RX RENEWAL (OUTPATIENT)
Age: 43
End: 2023-04-19

## 2023-04-19 VITALS
HEIGHT: 73 IN | SYSTOLIC BLOOD PRESSURE: 146 MMHG | TEMPERATURE: 98.1 F | OXYGEN SATURATION: 96 % | HEART RATE: 89 BPM | WEIGHT: 246 LBS | RESPIRATION RATE: 15 BRPM | DIASTOLIC BLOOD PRESSURE: 99 MMHG | BODY MASS INDEX: 32.6 KG/M2

## 2023-04-19 VITALS — SYSTOLIC BLOOD PRESSURE: 140 MMHG | DIASTOLIC BLOOD PRESSURE: 90 MMHG

## 2023-04-19 DIAGNOSIS — R03.0 ELEVATED BLOOD-PRESSURE READING, W/OUT DIAGNOSIS OF HYPERTENSION: ICD-10-CM

## 2023-04-19 PROCEDURE — 99214 OFFICE O/P EST MOD 30 MIN: CPT

## 2023-04-19 NOTE — ASSESSMENT
[FreeTextEntry1] : 42 Y OLD MALE WITH PMX OF DM ,DYSLIPIDEMIA ,HTN AND MILD OBESITY = LABS AND MEDS ORDERED\par ARLEEN = FLUTICASONE AND DESLORATADINE RX

## 2023-04-19 NOTE — HISTORY OF PRESENT ILLNESS
[de-identified] : COMES FOR F/U GOING TO [facility] SOON ;NEEDS MEDS ;ALMOST 4 M SINCE LAST TIME HE SMOKED CIG \par BP AT HOME 130-140/80-90

## 2023-04-19 NOTE — PHYSICAL EXAM
[No Acute Distress] : no acute distress [Rounded] : rounded [No CVA Tenderness] : no CVA  tenderness [No Joint Swelling] : no joint swelling [No Rash] : no rash [Coordination Grossly Intact] : coordination grossly intact [Normal] : affect was normal and insight and judgment were intact

## 2023-04-20 LAB
ALBUMIN SERPL ELPH-MCNC: 4.7 G/DL
ALP BLD-CCNC: 74 U/L
ALT SERPL-CCNC: 58 U/L
ANION GAP SERPL CALC-SCNC: 14 MMOL/L
AST SERPL-CCNC: 52 U/L
BILIRUB SERPL-MCNC: 0.5 MG/DL
BUN SERPL-MCNC: 8 MG/DL
CALCIUM SERPL-MCNC: 10.5 MG/DL
CHLORIDE SERPL-SCNC: 101 MMOL/L
CO2 SERPL-SCNC: 23 MMOL/L
CREAT SERPL-MCNC: 0.77 MG/DL
EGFR: 113 ML/MIN/1.73M2
ESTIMATED AVERAGE GLUCOSE: 134 MG/DL
GLUCOSE SERPL-MCNC: 94 MG/DL
HBA1C MFR BLD HPLC: 6.3 %
POTASSIUM SERPL-SCNC: 4.4 MMOL/L
PROT SERPL-MCNC: 7.5 G/DL
SODIUM SERPL-SCNC: 138 MMOL/L

## 2023-04-21 LAB
CHOLEST SERPL-MCNC: 167 MG/DL
HDLC SERPL-MCNC: 53 MG/DL
LDLC SERPL CALC-MCNC: 91 MG/DL
NONHDLC SERPL-MCNC: 114 MG/DL
TRIGL SERPL-MCNC: 116 MG/DL

## 2023-05-17 RX ORDER — FLUTICASONE PROPIONATE 50 UG/1
50 SPRAY, METERED NASAL DAILY
Qty: 3 | Refills: 0 | Status: ACTIVE | COMMUNITY
Start: 2023-04-19 | End: 1900-01-01

## 2023-06-05 ENCOUNTER — TRANSCRIPTION ENCOUNTER (OUTPATIENT)
Age: 43
End: 2023-06-05

## 2023-06-08 ENCOUNTER — NON-APPOINTMENT (OUTPATIENT)
Age: 43
End: 2023-06-08

## 2023-06-08 ENCOUNTER — APPOINTMENT (OUTPATIENT)
Dept: HEART AND VASCULAR | Facility: CLINIC | Age: 43
End: 2023-06-08
Payer: COMMERCIAL

## 2023-06-08 VITALS
WEIGHT: 243 LBS | TEMPERATURE: 97 F | DIASTOLIC BLOOD PRESSURE: 90 MMHG | RESPIRATION RATE: 16 BRPM | HEIGHT: 73 IN | OXYGEN SATURATION: 96 % | HEART RATE: 81 BPM | SYSTOLIC BLOOD PRESSURE: 147 MMHG | BODY MASS INDEX: 32.2 KG/M2

## 2023-06-08 PROCEDURE — 99204 OFFICE O/P NEW MOD 45 MIN: CPT | Mod: 25

## 2023-06-08 NOTE — HISTORY OF PRESENT ILLNESS
[FreeTextEntry1] : GUY SORTO is a 43 year y.o. M with medical history significant for HTN, HLD, DM, ex-smoker (quit 2023). \par He is here for cardiac evaluation.  \par \par Denies CP, SOB, palpitations, orthopnea, dizziness, syncope, LH, DUMONT, edema \par Patient denies changes in his exercise tolerance during the past 6 months. He can walk 10+ blocks and can climb 4 flights of stairs. \par Sleeps with 1 pillow\par \par He is compliant with meds \par \par He denies history of MI, CVA. \par Denies family history of premature ASCVD and /or SCD\par Father  at 48 y/o due to kidney failure s/p transplant with failure again. He was smoker and alcohol drinker. 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 negative stress test \par \par ECHO 2021: EF 60-65% . Nl LV size and function. PASP 30 mmHg\par \par Labs\par (23): ; ; LDL 91; HDL 53; K 4.4; Cre 0.77; AST 52; ALT 58; eGFR 113; A1c 6.3; \par (23): ; ; HDL 53; ; NonHDL 139; \par  3/23/2022\par Cr 0.71\par Tri 193\par Chol 179\par HDL 50\par LDL 90\par \par Labs 2021\par Glycerides 75\par Cholesterol 160\par HDL 51\par LDL 93\par CMP within normal limits\par \par

## 2023-06-08 NOTE — ASSESSMENT
[FreeTextEntry1] : GUY SORTO is a 43 year M with past medical history significant for HTN, HLD, DM2 (on oral meds). He is here for cardiac evaluation. He has no cardiac complaints at this time. He reports good exercise capacity. ECG is ok.  \par - I reviewed ECG --> Normal\par - I reviewed labs \par - continue taking cardiac meds (amlodipine-olmesartan 5-40mg, rosuvastatin 5 mg)\par - monitor BP and keep a BP log. \par - he wishes to screen for premature CAD --> schedule a calcium score \par - encouraged on smoking cessation \par - Increase ambulation as tolerated to aim for approximately 30 minutes of moderate activity 5 days a week.  Heart healthy diet low in sodium, sugars and saturated fats and high in fruits, vegetables and whole grains. Weight loss.\par \par \par Patient advised to go to the nearest ED whenever any symptoms persist and/or worsens.  Patient/Family/Caregiver verbalized complete understanding of these instructions.\par \par I spent a total of 45 minutes with more than 50% spent on counseling and coordinating care.\par \par f/u in 6 months or sooner if any symptoms. \par \par \par

## 2023-06-08 NOTE — REVIEW OF SYSTEMS
[Fever] : no fever [Headache] : no headache [Chills] : no chills [Feeling Fatigued] : not feeling fatigued [SOB] : no shortness of breath [Dyspnea on exertion] : not dyspnea during exertion [Chest Discomfort] : no chest discomfort [Lower Ext Edema] : no extremity edema [Leg Claudication] : no intermittent leg claudication [Palpitations] : no palpitations [Orthopnea] : no orthopnea [PND] : no PND [Syncope] : no syncope [Cough] : no cough [Wheezing] : no wheezing [Coughing Up Blood] : no hemoptysis [Dizziness] : no dizziness [Tremor] : no tremor was seen [Convulsions] : no convulsions [Tingling (Paresthesia)] : no tingling [Limb Weakness (Paresis)] : no limb weakness (Paresis) [Confusion] : no confusion was observed [Depression] : no depression [Anxiety] : no anxiety [Under Stress] : not under stress [Suicidal] : not suicidal [Easy Bleeding] : no tendency for easy bleeding

## 2023-07-26 ENCOUNTER — APPOINTMENT (OUTPATIENT)
Dept: INTERNAL MEDICINE | Facility: CLINIC | Age: 43
End: 2023-07-26
Payer: COMMERCIAL

## 2023-07-26 VITALS
WEIGHT: 241 LBS | OXYGEN SATURATION: 97 % | HEART RATE: 90 BPM | BODY MASS INDEX: 31.94 KG/M2 | TEMPERATURE: 98 F | RESPIRATION RATE: 16 BRPM | HEIGHT: 73 IN | SYSTOLIC BLOOD PRESSURE: 137 MMHG | DIASTOLIC BLOOD PRESSURE: 87 MMHG

## 2023-07-26 DIAGNOSIS — B35.6 TINEA CRURIS: ICD-10-CM

## 2023-07-26 PROCEDURE — 99214 OFFICE O/P EST MOD 30 MIN: CPT

## 2023-07-26 RX ORDER — KETOCONAZOLE 20 MG/G
2 CREAM TOPICAL TWICE DAILY
Qty: 1 | Refills: 0 | Status: ACTIVE | COMMUNITY
Start: 2023-07-26 | End: 1900-01-01

## 2023-07-26 NOTE — ASSESSMENT
[FreeTextEntry1] : 43 Y OLD MALE WITH PMX OF HTN ,DM ,DYSLIPIDEMIA AND MILD OBESITY = ALBS AND MEDS ORDERED\par TINEA CRURIS = KETOCONAZOLE CREAM \par RTO 3 M

## 2023-07-26 NOTE — PHYSICAL EXAM
[No Acute Distress] : no acute distress [Normal Sclera/Conjunctiva] : normal sclera/conjunctiva [Normal Outer Ear/Nose] : the outer ears and nose were normal in appearance [Normal] : soft, non-tender, non-distended, no masses palpated, no HSM and normal bowel sounds [Rounded] : rounded [Alert and Oriented x3] : oriented to person, place, and time

## 2023-07-27 LAB
ALBUMIN SERPL ELPH-MCNC: 4.9 G/DL
ALP BLD-CCNC: 75 U/L
ALT SERPL-CCNC: 61 U/L
ANION GAP SERPL CALC-SCNC: 16 MMOL/L
AST SERPL-CCNC: 44 U/L
BILIRUB SERPL-MCNC: 0.4 MG/DL
BUN SERPL-MCNC: 9 MG/DL
CALCIUM SERPL-MCNC: 10.3 MG/DL
CHLORIDE SERPL-SCNC: 102 MMOL/L
CHOLEST SERPL-MCNC: 190 MG/DL
CO2 SERPL-SCNC: 21 MMOL/L
CREAT SERPL-MCNC: 0.88 MG/DL
EGFR: 108 ML/MIN/1.73M2
ESTIMATED AVERAGE GLUCOSE: 137 MG/DL
GLUCOSE SERPL-MCNC: 100 MG/DL
HBA1C MFR BLD HPLC: 6.4 %
HDLC SERPL-MCNC: 56 MG/DL
LDLC SERPL CALC-MCNC: 114 MG/DL
NONHDLC SERPL-MCNC: 134 MG/DL
POTASSIUM SERPL-SCNC: 4 MMOL/L
PROT SERPL-MCNC: 8 G/DL
SODIUM SERPL-SCNC: 138 MMOL/L
TRIGL SERPL-MCNC: 110 MG/DL

## 2023-10-24 NOTE — REVIEW OF SYSTEMS
On arrival patient met SIRS criteria with T 101.4, . Labs were significant for leukocytosis of 15.08. Lactic acid was normal. Procal negative. CRP and ESR elevated. MRI obtained on 10/25/23 demonstrated no definite presence of osteomyelitis. Now s/p I&D with Podiatry. Patient's signs of infection continue to improve - BC remain negative, wound cultures growing Staph aureus resistant to ampicillin but susceptible to cefazolin.     Plan:  -Blood cultures negative x2, anaerobic wound culture negative, MRSA negative, acid fast bacilli culture negative  -Urine culture demonstrated 80,000-89,000 cfu/mL of staph epidermidis  -Wound culture positive for staph aureus, given resistance profile, continue on Ancef (day 3)  -Trend WBC, fever curves  -Tylenol PRN for fever, pain  -See plan under "cellulitis of left lower extremity" [Negative] : Heme/Lymph

## 2023-11-08 ENCOUNTER — APPOINTMENT (OUTPATIENT)
Dept: INTERNAL MEDICINE | Facility: CLINIC | Age: 43
End: 2023-11-08
Payer: COMMERCIAL

## 2023-11-08 VITALS
HEART RATE: 96 BPM | OXYGEN SATURATION: 96 % | WEIGHT: 250 LBS | SYSTOLIC BLOOD PRESSURE: 133 MMHG | RESPIRATION RATE: 16 BRPM | HEIGHT: 73 IN | BODY MASS INDEX: 33.13 KG/M2 | DIASTOLIC BLOOD PRESSURE: 86 MMHG

## 2023-11-08 DIAGNOSIS — Z00.00 ENCOUNTER FOR GENERAL ADULT MEDICAL EXAMINATION W/OUT ABNORMAL FINDINGS: ICD-10-CM

## 2023-11-08 PROCEDURE — 99396 PREV VISIT EST AGE 40-64: CPT

## 2023-11-08 RX ORDER — ORAL SEMAGLUTIDE 7 MG/1
7 TABLET ORAL
Qty: 90 | Refills: 0 | Status: ACTIVE | COMMUNITY
Start: 2023-11-08 | End: 1900-01-01

## 2023-11-09 LAB
25(OH)D3 SERPL-MCNC: 17.7 NG/ML
ALBUMIN SERPL ELPH-MCNC: 4.8 G/DL
ALP BLD-CCNC: 79 U/L
ALT SERPL-CCNC: 58 U/L
ANION GAP SERPL CALC-SCNC: 12 MMOL/L
APPEARANCE: CLEAR
AST SERPL-CCNC: 57 U/L
BILIRUB SERPL-MCNC: 0.4 MG/DL
BILIRUBIN URINE: NEGATIVE
BLOOD URINE: NEGATIVE
BUN SERPL-MCNC: 11 MG/DL
CALCIUM SERPL-MCNC: 10.4 MG/DL
CHLORIDE SERPL-SCNC: 104 MMOL/L
CHOLEST SERPL-MCNC: 197 MG/DL
CO2 SERPL-SCNC: 25 MMOL/L
COLOR: YELLOW
CREAT SERPL-MCNC: 0.83 MG/DL
CREAT SPEC-SCNC: 75 MG/DL
EGFR: 110 ML/MIN/1.73M2
ESTIMATED AVERAGE GLUCOSE: 137 MG/DL
GLUCOSE QUALITATIVE U: NEGATIVE MG/DL
GLUCOSE SERPL-MCNC: 93 MG/DL
HBA1C MFR BLD HPLC: 6.4 %
HDLC SERPL-MCNC: 47 MG/DL
KETONES URINE: NEGATIVE MG/DL
LDLC SERPL CALC-MCNC: 118 MG/DL
LEUKOCYTE ESTERASE URINE: NEGATIVE
MICROALBUMIN 24H UR DL<=1MG/L-MCNC: <1.2 MG/DL
MICROALBUMIN/CREAT 24H UR-RTO: NORMAL MG/G
NITRITE URINE: NEGATIVE
NONHDLC SERPL-MCNC: 150 MG/DL
PH URINE: 6
POTASSIUM SERPL-SCNC: 4.4 MMOL/L
PROT SERPL-MCNC: 7.7 G/DL
PROTEIN URINE: NEGATIVE MG/DL
PSA FREE FLD-MCNC: 44 %
PSA FREE SERPL-MCNC: 0.17 NG/ML
PSA SERPL-MCNC: 0.39 NG/ML
SODIUM SERPL-SCNC: 142 MMOL/L
SPECIFIC GRAVITY URINE: 1.02
TRIGL SERPL-MCNC: 178 MG/DL
TSH SERPL-ACNC: 0.78 UIU/ML
UROBILINOGEN URINE: 0.2 MG/DL

## 2023-11-16 ENCOUNTER — APPOINTMENT (OUTPATIENT)
Dept: HEART AND VASCULAR | Facility: CLINIC | Age: 43
End: 2023-11-16
Payer: COMMERCIAL

## 2023-11-16 VITALS
RESPIRATION RATE: 15 BRPM | BODY MASS INDEX: 33.13 KG/M2 | HEART RATE: 98 BPM | SYSTOLIC BLOOD PRESSURE: 133 MMHG | WEIGHT: 250 LBS | TEMPERATURE: 98.4 F | OXYGEN SATURATION: 95 % | DIASTOLIC BLOOD PRESSURE: 80 MMHG | HEIGHT: 73 IN

## 2023-11-16 DIAGNOSIS — R74.01 ELEVATION OF LEVELS OF LIVER TRANSAMINASE LEVELS: ICD-10-CM

## 2023-11-16 PROCEDURE — 99214 OFFICE O/P EST MOD 30 MIN: CPT

## 2023-11-21 LAB
HCT VFR BLD CALC: 47.4 %
HGB BLD-MCNC: 15.6 G/DL
MCHC RBC-ENTMCNC: 29.7 PG
MCHC RBC-ENTMCNC: 32.9 GM/DL
MCV RBC AUTO: 90.3 FL
PLATELET # BLD AUTO: 404 K/UL
RBC # BLD: 5.25 M/UL
RBC # FLD: 12.8 %
WBC # FLD AUTO: 8.83 K/UL

## 2024-01-19 ENCOUNTER — TRANSCRIPTION ENCOUNTER (OUTPATIENT)
Age: 44
End: 2024-01-19

## 2024-02-16 ENCOUNTER — APPOINTMENT (OUTPATIENT)
Dept: INTERNAL MEDICINE | Facility: CLINIC | Age: 44
End: 2024-02-16
Payer: COMMERCIAL

## 2024-02-16 VITALS
RESPIRATION RATE: 15 BRPM | WEIGHT: 255 LBS | SYSTOLIC BLOOD PRESSURE: 142 MMHG | HEART RATE: 105 BPM | OXYGEN SATURATION: 95 % | TEMPERATURE: 98.5 F | DIASTOLIC BLOOD PRESSURE: 85 MMHG | HEIGHT: 73 IN | BODY MASS INDEX: 33.8 KG/M2

## 2024-02-16 DIAGNOSIS — E66.9 TYPE 2 DIABETES MELLITUS WITH OTHER SPECIFIED COMPLICATION: ICD-10-CM

## 2024-02-16 DIAGNOSIS — E11.69 TYPE 2 DIABETES MELLITUS WITH OTHER SPECIFIED COMPLICATION: ICD-10-CM

## 2024-02-16 LAB
ALBUMIN SERPL ELPH-MCNC: 4.6 G/DL
ALP BLD-CCNC: 78 U/L
ALT SERPL-CCNC: 47 U/L
ANION GAP SERPL CALC-SCNC: 16 MMOL/L
AST SERPL-CCNC: 38 U/L
BILIRUB SERPL-MCNC: 0.5 MG/DL
BUN SERPL-MCNC: 11 MG/DL
CALCIUM SERPL-MCNC: 9.8 MG/DL
CHLORIDE SERPL-SCNC: 99 MMOL/L
CO2 SERPL-SCNC: 23 MMOL/L
CREAT SERPL-MCNC: 0.74 MG/DL
EGFR: 114 ML/MIN/1.73M2
ESTIMATED AVERAGE GLUCOSE: 140 MG/DL
GLUCOSE SERPL-MCNC: 236 MG/DL
HBA1C MFR BLD HPLC: 6.5 %
POTASSIUM SERPL-SCNC: 4.6 MMOL/L
PROT SERPL-MCNC: 7.2 G/DL
SODIUM SERPL-SCNC: 138 MMOL/L

## 2024-02-16 PROCEDURE — 99214 OFFICE O/P EST MOD 30 MIN: CPT

## 2024-02-16 NOTE — PHYSICAL EXAM
[No Acute Distress] : no acute distress [Normal Sclera/Conjunctiva] : normal sclera/conjunctiva [Normal Outer Ear/Nose] : the outer ears and nose were normal in appearance [No Edema] : there was no peripheral edema [Normal] : soft, non-tender, non-distended, no masses palpated, no HSM and normal bowel sounds [Obese] : obese [Normal Anterior Cervical Nodes] : no anterior cervical lymphadenopathy [No Rash] : no rash [Coordination Grossly Intact] : coordination grossly intact [No Focal Deficits] : no focal deficits [Alert and Oriented x3] : oriented to person, place, and time

## 2024-02-16 NOTE — ASSESSMENT
[FreeTextEntry1] : 43 Y OLD MALE WITH PMX OF HTN ,DM ,DYSLIPIDEMIA AND OBESITY = LABS AND MEDS ORDERED RTO 3 M

## 2024-02-25 ENCOUNTER — TRANSCRIPTION ENCOUNTER (OUTPATIENT)
Age: 44
End: 2024-02-25

## 2024-02-28 ENCOUNTER — TRANSCRIPTION ENCOUNTER (OUTPATIENT)
Age: 44
End: 2024-02-28

## 2024-03-04 LAB
CHOLEST SERPL-MCNC: 176 MG/DL
HDLC SERPL-MCNC: 51 MG/DL
LDLC SERPL CALC-MCNC: 93 MG/DL
NONHDLC SERPL-MCNC: 125 MG/DL
TRIGL SERPL-MCNC: 188 MG/DL

## 2024-04-02 ENCOUNTER — NON-APPOINTMENT (OUTPATIENT)
Age: 44
End: 2024-04-02

## 2024-04-03 ENCOUNTER — APPOINTMENT (OUTPATIENT)
Dept: INTERNAL MEDICINE | Facility: CLINIC | Age: 44
End: 2024-04-03
Payer: COMMERCIAL

## 2024-04-03 VITALS
WEIGHT: 253 LBS | BODY MASS INDEX: 33.53 KG/M2 | HEIGHT: 73 IN | HEART RATE: 97 BPM | OXYGEN SATURATION: 95 % | SYSTOLIC BLOOD PRESSURE: 130 MMHG | DIASTOLIC BLOOD PRESSURE: 88 MMHG | RESPIRATION RATE: 15 BRPM | TEMPERATURE: 98.1 F

## 2024-04-03 DIAGNOSIS — J30.2 OTHER SEASONAL ALLERGIC RHINITIS: ICD-10-CM

## 2024-04-03 DIAGNOSIS — S83.519A SPRAIN OF ANTERIOR CRUCIATE LIGAMENT OF UNSPECIFIED KNEE, INITIAL ENCOUNTER: ICD-10-CM

## 2024-04-03 DIAGNOSIS — M62.830 MUSCLE SPASM OF BACK: ICD-10-CM

## 2024-04-03 PROCEDURE — 99214 OFFICE O/P EST MOD 30 MIN: CPT

## 2024-04-03 RX ORDER — DESLORATADINE 5 MG/1
5 TABLET ORAL DAILY
Qty: 90 | Refills: 0 | Status: ACTIVE | COMMUNITY
Start: 2023-04-19 | End: 1900-01-01

## 2024-04-03 RX ORDER — OLOPATADINE HCL 1 MG/ML
0.1 SOLUTION/ DROPS OPHTHALMIC TWICE DAILY
Qty: 1 | Refills: 3 | Status: ACTIVE | COMMUNITY
Start: 2021-05-26 | End: 1900-01-01

## 2024-04-03 NOTE — REVIEW OF SYSTEMS
[Redness] : redness [Itching] : itching [Postnasal Drip] : postnasal drip [Nasal Discharge] : nasal discharge [Joint Pain] : joint pain [Joint Stiffness] : joint stiffness [Back Pain] : back pain [Joint Swelling] : joint swelling [Negative] : Heme/Lymph

## 2024-04-03 NOTE — ASSESSMENT
[FreeTextEntry1] : 43 Y OLD MALE WITH RE INJURED R KNEE AFTER 30 Y = ACL TEAR AND ? MENISCAL TEAR = MRI AND ORTHO ORDERED MELOXICAM 15 MG DAILY  ARLEEN= DESLORATADINE ORDERED LOWER BACK PAIN = ACUPUNCTURE REF

## 2024-04-03 NOTE — HISTORY OF PRESENT ILLNESS
[de-identified] : PT SUSTAINED A SPRAIN R KNEE 1 WEEK AGO ;DEVELOPED EFFUSION AND INSTABILITY;HAD R KNEE ARTHROSCOPY ABOUT 30 Y AGO IN [place]  ALSO CC OF RECURRENT ARLEEN SX NEEDS MED S LAST NEEDS REF FOR ACUPUNCTURE FOR SHOULDER AND BACK PAIN

## 2024-04-03 NOTE — PHYSICAL EXAM
[No Acute Distress] : no acute distress [Normal Sclera/Conjunctiva] : normal sclera/conjunctiva [Normal Outer Ear/Nose] : the outer ears and nose were normal in appearance [No JVD] : no jugular venous distention [Normal Oropharynx] : the oropharynx was normal [No Respiratory Distress] : no respiratory distress  [Normal Rate] : normal rate  [de-identified] : DECREASED ROM ,AN DR KNEE EFFUSSION WITH MEDIAL COMPARTMENT TENDERNESS AND DRAW SIGN

## 2024-04-14 ENCOUNTER — RX RENEWAL (OUTPATIENT)
Age: 44
End: 2024-04-14

## 2024-05-01 ENCOUNTER — RX RENEWAL (OUTPATIENT)
Age: 44
End: 2024-05-01

## 2024-05-01 RX ORDER — AMLODIPINE AND OLMESARTAN MEDOXOMIL 5; 40 MG/1; MG/1
5-40 TABLET ORAL
Qty: 30 | Refills: 2 | Status: ACTIVE | COMMUNITY
Start: 2021-03-29 | End: 1900-01-01

## 2024-05-01 RX ORDER — ROSUVASTATIN CALCIUM 5 MG/1
5 TABLET, FILM COATED ORAL
Qty: 90 | Refills: 0 | Status: ACTIVE | COMMUNITY
Start: 2022-02-03 | End: 1900-01-01

## 2024-05-16 ENCOUNTER — NON-APPOINTMENT (OUTPATIENT)
Age: 44
End: 2024-05-16

## 2024-05-16 ENCOUNTER — APPOINTMENT (OUTPATIENT)
Dept: HEART AND VASCULAR | Facility: CLINIC | Age: 44
End: 2024-05-16
Payer: COMMERCIAL

## 2024-05-16 VITALS
DIASTOLIC BLOOD PRESSURE: 93 MMHG | TEMPERATURE: 98 F | HEIGHT: 73 IN | HEART RATE: 92 BPM | RESPIRATION RATE: 16 BRPM | WEIGHT: 253 LBS | SYSTOLIC BLOOD PRESSURE: 137 MMHG | BODY MASS INDEX: 33.53 KG/M2 | OXYGEN SATURATION: 96 %

## 2024-05-16 PROCEDURE — 93000 ELECTROCARDIOGRAM COMPLETE: CPT

## 2024-05-16 PROCEDURE — 99213 OFFICE O/P EST LOW 20 MIN: CPT | Mod: 25

## 2024-05-16 NOTE — HISTORY OF PRESENT ILLNESS
[FreeTextEntry1] : GUY SORTO is a 43 year y.o. M with medical history significant for HTN, HLD, DM, ex-smoker (quit 2023).  He is here for follow-up.   The last time I saw him was    He is overall in his usual state of health. He has no cardiac complaints at this time.  Denies CP, SOB, palpitations, orthopnea, dizziness, syncope, LH, DUMONT, edema  Patient denies changes in his exercise tolerance during the past 6 months. He can walk 10+ blocks and can climb 4 flights of stairs.  Sleeps with 1 pillow  He is compliant with meds.    He denies history of MI, CVA.  Denies family history of premature ASCVD and /or SCD Father  at 50 y/o due to kidney failure s/p transplant with failure again. He was smoker and alcohol drinker.   No hospitalizations in the past year.   PMH HTN HLD DM  PSH meniscus repair  ALL NKDA  MEDS amlodipine-olmesartan 5-40 mg daily  rosuvastatin 5 mg daily  metformin    smokes 2-3 cigarettes a day, social etoh, no drugs works for dept [employer] ECG (24): NSR at 69 bpm w nl axis and no STT abn  ECG (23): NSR at 75 bpm w nl axis and no STT abn  EKG 3/23/2022 NSR, wnl  EKG 2021 SR,wnl  EXERCISE STRESS TEST ([facility]) 2018 8:40 Aly protocol, 91% MPHR . negative stress test   ECHO 2021: EF 60-65%. Nl LV size and function. PASP 30 mmHg  Labs (24): A1C 6.5, CRE 0.74, K 4.6, AST 38, ALT 47, ALK 78, eGFR 114, , , HDL 51, LDL 93, NON-. (23): Hgb 15.6; Hct 47.4; A1c 6.4; ; ; HDL 47; ; NonHDL 150; Cre 0.83; K 4.4; eGFR 110; AST 57; ALT  58; TSH 0.78 (23): ; ; LDL 91; HDL 53; K 4.4; Cre 0.77; AST 52; ALT 58; eGFR 113; A1c 6.3;  (23): ; ; HDL 53; ; NonHDL 139;   3/23/22: Cr 0.71; Tri 193; Chol 179; HDL 50; LDL 90;  Labs 21: Glycerides 75; Cholesterol 160; HDL 51; LDL 93 CMP within normal limits  Calcium score (23): score=0

## 2024-05-16 NOTE — ASSESSMENT
[FreeTextEntry1] : GUY SORTO is a 43 year M with past medical history significant for HTN, HLD, DM2 (on oral meds), elevated transaminases, ex-smoker (quit 1/2023). He is here for follow-up. He has no cardiac complaints at this time. He reports good exercise capacity. ECG is ok. Calcium score =0.   - I reviewed ECG --> Normal.  - I reviewed calcium score = 0 - I reviewed labs - continue taking cardiac meds (amlodipine-olmesartan 5-40mg, rosuvastatin 5 mg) - monitor BP and keep a BP log. - monitor LFTs  - encouraged on smoking cessation - Increase ambulation as tolerated to aim for approximately 30 minutes of moderate activity 5 days a week. Heart healthy diet low in sodium, sugars and saturated fats and high in fruits, vegetables and whole grains. Weight loss.    Patient advised to go to the nearest ED whenever any symptoms persist and/or worsens. Patient/Family/Caregiver verbalized complete understanding of these instructions.   I spent a total of 25 minutes with more than 50% spent on counseling and coordinating care.   f/u in 6 months or sooner if any symptoms.

## 2024-05-17 ENCOUNTER — APPOINTMENT (OUTPATIENT)
Dept: INTERNAL MEDICINE | Facility: CLINIC | Age: 44
End: 2024-05-17
Payer: COMMERCIAL

## 2024-05-17 VITALS
TEMPERATURE: 97.7 F | OXYGEN SATURATION: 95 % | RESPIRATION RATE: 15 BRPM | WEIGHT: 246 LBS | HEIGHT: 73 IN | DIASTOLIC BLOOD PRESSURE: 78 MMHG | BODY MASS INDEX: 32.6 KG/M2 | HEART RATE: 76 BPM | SYSTOLIC BLOOD PRESSURE: 116 MMHG

## 2024-05-17 DIAGNOSIS — E78.5 HYPERLIPIDEMIA, UNSPECIFIED: ICD-10-CM

## 2024-05-17 DIAGNOSIS — I10 ESSENTIAL (PRIMARY) HYPERTENSION: ICD-10-CM

## 2024-05-17 DIAGNOSIS — F41.9 ANXIETY DISORDER, UNSPECIFIED: ICD-10-CM

## 2024-05-17 DIAGNOSIS — E66.9 OBESITY, UNSPECIFIED: ICD-10-CM

## 2024-05-17 LAB
ALBUMIN SERPL ELPH-MCNC: 4.8 G/DL
ALP BLD-CCNC: 72 U/L
ALT SERPL-CCNC: 49 U/L
ANION GAP SERPL CALC-SCNC: 13 MMOL/L
AST SERPL-CCNC: 43 U/L
BILIRUB SERPL-MCNC: 0.7 MG/DL
BUN SERPL-MCNC: 8 MG/DL
CALCIUM SERPL-MCNC: 10.4 MG/DL
CHLORIDE SERPL-SCNC: 99 MMOL/L
CHOLEST SERPL-MCNC: 184 MG/DL
CO2 SERPL-SCNC: 25 MMOL/L
CREAT SERPL-MCNC: 0.86 MG/DL
EGFR: 109 ML/MIN/1.73M2
ESTIMATED AVERAGE GLUCOSE: 131 MG/DL
GLUCOSE SERPL-MCNC: 92 MG/DL
HBA1C MFR BLD HPLC: 6.2 %
HDLC SERPL-MCNC: 48 MG/DL
LDLC SERPL CALC-MCNC: 113 MG/DL
NONHDLC SERPL-MCNC: 136 MG/DL
POTASSIUM SERPL-SCNC: 5.1 MMOL/L
PROT SERPL-MCNC: 7.8 G/DL
SODIUM SERPL-SCNC: 137 MMOL/L
TRIGL SERPL-MCNC: 128 MG/DL

## 2024-05-17 PROCEDURE — 99214 OFFICE O/P EST MOD 30 MIN: CPT

## 2024-05-17 RX ORDER — METFORMIN HYDROCHLORIDE 500 MG/1
500 TABLET, COATED ORAL
Qty: 90 | Refills: 1 | Status: ACTIVE | COMMUNITY
Start: 2021-10-12 | End: 1900-01-01

## 2024-05-17 NOTE — ASSESSMENT
[FreeTextEntry1] : 43 Y OLD MALE WITH PMX OF HTN = STABLE  DM = METFORMIN 500 MG ORDERED,HBA1C TODAY  DYSLIPIDEMIA = CONTINUE CRESTOR 5M G ;LABS  DJD R KNEE AND MEDIAL CARLOS LIG MINOR TEAR = ORTHO EVAL  RTO 3 M

## 2024-05-20 ENCOUNTER — RX RENEWAL (OUTPATIENT)
Age: 44
End: 2024-05-20

## 2024-05-20 RX ORDER — MELOXICAM 15 MG/1
15 TABLET ORAL DAILY
Qty: 15 | Refills: 0 | Status: ACTIVE | COMMUNITY
Start: 2021-11-03 | End: 1900-01-01

## 2024-07-12 ENCOUNTER — RX RENEWAL (OUTPATIENT)
Age: 44
End: 2024-07-12

## 2024-08-07 ENCOUNTER — RX RENEWAL (OUTPATIENT)
Age: 44
End: 2024-08-07

## 2024-09-04 ENCOUNTER — APPOINTMENT (OUTPATIENT)
Age: 44
End: 2024-09-04
Payer: COMMERCIAL

## 2024-09-04 VITALS
DIASTOLIC BLOOD PRESSURE: 81 MMHG | HEART RATE: 110 BPM | HEIGHT: 73 IN | OXYGEN SATURATION: 96 % | TEMPERATURE: 97.5 F | SYSTOLIC BLOOD PRESSURE: 118 MMHG | WEIGHT: 243 LBS | BODY MASS INDEX: 32.2 KG/M2 | RESPIRATION RATE: 14 BRPM
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
## 2024-09-04 PROCEDURE — 99214 OFFICE O/P EST MOD 30 MIN: CPT

## 2024-09-04 NOTE — HISTORY OF PRESENT ILLNESS
[FreeTextEntry8] : AFTER FISHING THIS MORNING WAS WALKING BACK AND FALL ON THE ROCKS WITH LEFT ARMA ND LLE BLUNT TRAUMA  ABLE TO GET UP AND WALK TO THE CAR ;NO HEAD TRAUMA ,NO TRUNK TRAUMA ,JUT LEFT ARM AND LEFT LEG

## 2024-09-04 NOTE — ASSESSMENT
[FreeTextEntry1] : 44 Y OLD MALE WITH FALL 4 HS AGO AND LLE BLIUNT TRAUMA = XR LLE ;ICE AND MELOXICAM RECOMM AND ORDERED RTO AS PER RESULTS

## 2024-09-04 NOTE — PHYSICAL EXAM
[No Acute Distress] : no acute distress [Well Nourished] : well nourished [Well Developed] : well developed [Well-Appearing] : well-appearing [Normal Sclera/Conjunctiva] : normal sclera/conjunctiva [PERRL] : pupils equal round and reactive to light [EOMI] : extraocular movements intact [Normal Outer Ear/Nose] : the outer ears and nose were normal in appearance [Normal Oropharynx] : the oropharynx was normal [No JVD] : no jugular venous distention [No Lymphadenopathy] : no lymphadenopathy [Supple] : supple [Thyroid Normal, No Nodules] : the thyroid was normal and there were no nodules present [No Respiratory Distress] : no respiratory distress  [No Accessory Muscle Use] : no accessory muscle use [Clear to Auscultation] : lungs were clear to auscultation bilaterally [Normal Rate] : normal rate  [Regular Rhythm] : with a regular rhythm [Normal S1, S2] : normal S1 and S2 [No Murmur] : no murmur heard [No Carotid Bruits] : no carotid bruits [No Abdominal Bruit] : a ~M bruit was not heard ~T in the abdomen [No Varicosities] : no varicosities [Pedal Pulses Present] : the pedal pulses are present [No Edema] : there was no peripheral edema [No Palpable Aorta] : no palpable aorta [No Extremity Clubbing/Cyanosis] : no extremity clubbing/cyanosis [Soft] : abdomen soft [Non Tender] : non-tender [Non-distended] : non-distended [No Masses] : no abdominal mass palpated [No HSM] : no HSM [Normal Bowel Sounds] : normal bowel sounds [Normal Posterior Cervical Nodes] : no posterior cervical lymphadenopathy [Normal Anterior Cervical Nodes] : no anterior cervical lymphadenopathy [No CVA Tenderness] : no CVA  tenderness [No Spinal Tenderness] : no spinal tenderness [No Joint Swelling] : no joint swelling [Grossly Normal Strength/Tone] : grossly normal strength/tone [No Rash] : no rash [Coordination Grossly Intact] : coordination grossly intact [No Focal Deficits] : no focal deficits [Normal Gait] : normal gait [Deep Tendon Reflexes (DTR)] : deep tendon reflexes were 2+ and symmetric [Normal Affect] : the affect was normal [Normal Insight/Judgement] : insight and judgment were intact [de-identified] : TENDER LEFT CALF MUSCLE AND EXTERNAL ASPECT LLE BELLOW THE KNEE

## 2024-09-11 ENCOUNTER — APPOINTMENT (OUTPATIENT)
Dept: INTERNAL MEDICINE | Facility: CLINIC | Age: 44
End: 2024-09-11
Payer: COMMERCIAL

## 2024-09-11 VITALS
DIASTOLIC BLOOD PRESSURE: 87 MMHG | HEART RATE: 93 BPM | BODY MASS INDEX: 33.27 KG/M2 | TEMPERATURE: 98 F | RESPIRATION RATE: 15 BRPM | HEIGHT: 73 IN | SYSTOLIC BLOOD PRESSURE: 140 MMHG | OXYGEN SATURATION: 95 % | WEIGHT: 251 LBS

## 2024-09-11 DIAGNOSIS — E66.9 OBESITY, UNSPECIFIED: ICD-10-CM

## 2024-09-11 DIAGNOSIS — E78.5 HYPERLIPIDEMIA, UNSPECIFIED: ICD-10-CM

## 2024-09-11 DIAGNOSIS — E11.69 TYPE 2 DIABETES MELLITUS WITH OTHER SPECIFIED COMPLICATION: ICD-10-CM

## 2024-09-11 DIAGNOSIS — G47.33 OBSTRUCTIVE SLEEP APNEA (ADULT) (PEDIATRIC): ICD-10-CM

## 2024-09-11 DIAGNOSIS — I10 ESSENTIAL (PRIMARY) HYPERTENSION: ICD-10-CM

## 2024-09-11 DIAGNOSIS — R73.9 HYPERGLYCEMIA, UNSPECIFIED: ICD-10-CM

## 2024-09-11 PROCEDURE — 99214 OFFICE O/P EST MOD 30 MIN: CPT

## 2024-09-12 LAB
ALBUMIN SERPL ELPH-MCNC: 4.7 G/DL
ALP BLD-CCNC: 76 U/L
ALT SERPL-CCNC: 65 U/L
ANION GAP SERPL CALC-SCNC: 16 MMOL/L
AST SERPL-CCNC: 62 U/L
BILIRUB SERPL-MCNC: 0.4 MG/DL
BUN SERPL-MCNC: 11 MG/DL
CALCIUM SERPL-MCNC: 9.9 MG/DL
CHLORIDE SERPL-SCNC: 102 MMOL/L
CHOLEST SERPL-MCNC: 214 MG/DL
CO2 SERPL-SCNC: 22 MMOL/L
CREAT SERPL-MCNC: 0.86 MG/DL
EGFR: 108 ML/MIN/1.73M2
ESTIMATED AVERAGE GLUCOSE: 137 MG/DL
GLUCOSE SERPL-MCNC: 86 MG/DL
HBA1C MFR BLD HPLC: 6.4 %
HDLC SERPL-MCNC: 52 MG/DL
LDLC SERPL CALC-MCNC: 139 MG/DL
NONHDLC SERPL-MCNC: 162 MG/DL
POTASSIUM SERPL-SCNC: 4.5 MMOL/L
PROT SERPL-MCNC: 7.6 G/DL
SODIUM SERPL-SCNC: 139 MMOL/L
TRIGL SERPL-MCNC: 130 MG/DL

## 2024-10-01 ENCOUNTER — RX RENEWAL (OUTPATIENT)
Age: 44
End: 2024-10-01

## 2024-11-09 ENCOUNTER — RX RENEWAL (OUTPATIENT)
Age: 44
End: 2024-11-09

## 2024-11-14 ENCOUNTER — NON-APPOINTMENT (OUTPATIENT)
Age: 44
End: 2024-11-14

## 2024-11-14 ENCOUNTER — APPOINTMENT (OUTPATIENT)
Age: 44
End: 2024-11-14
Payer: COMMERCIAL

## 2024-11-14 VITALS
HEART RATE: 85 BPM | SYSTOLIC BLOOD PRESSURE: 149 MMHG | TEMPERATURE: 98 F | BODY MASS INDEX: 33.27 KG/M2 | DIASTOLIC BLOOD PRESSURE: 94 MMHG | HEIGHT: 73 IN | OXYGEN SATURATION: 98 % | RESPIRATION RATE: 16 BRPM | WEIGHT: 251 LBS

## 2024-11-14 DIAGNOSIS — R74.01 ELEVATION OF LEVELS OF LIVER TRANSAMINASE LEVELS: ICD-10-CM

## 2024-11-14 DIAGNOSIS — I10 ESSENTIAL (PRIMARY) HYPERTENSION: ICD-10-CM

## 2024-11-14 DIAGNOSIS — E78.5 HYPERLIPIDEMIA, UNSPECIFIED: ICD-10-CM

## 2024-11-14 DIAGNOSIS — E11.69 TYPE 2 DIABETES MELLITUS WITH OTHER SPECIFIED COMPLICATION: ICD-10-CM

## 2024-11-14 PROCEDURE — 99214 OFFICE O/P EST MOD 30 MIN: CPT | Mod: 25

## 2024-11-14 PROCEDURE — 93000 ELECTROCARDIOGRAM COMPLETE: CPT

## 2024-11-27 ENCOUNTER — APPOINTMENT (OUTPATIENT)
Age: 44
End: 2024-11-27
Payer: COMMERCIAL

## 2024-11-27 VITALS
WEIGHT: 248 LBS | SYSTOLIC BLOOD PRESSURE: 126 MMHG | TEMPERATURE: 98.2 F | RESPIRATION RATE: 15 BRPM | BODY MASS INDEX: 32.87 KG/M2 | HEART RATE: 94 BPM | HEIGHT: 73 IN | OXYGEN SATURATION: 96 % | DIASTOLIC BLOOD PRESSURE: 86 MMHG

## 2024-11-27 DIAGNOSIS — I10 ESSENTIAL (PRIMARY) HYPERTENSION: ICD-10-CM

## 2024-11-27 DIAGNOSIS — F41.9 ANXIETY DISORDER, UNSPECIFIED: ICD-10-CM

## 2024-11-27 DIAGNOSIS — G47.33 OBSTRUCTIVE SLEEP APNEA (ADULT) (PEDIATRIC): ICD-10-CM

## 2024-11-27 DIAGNOSIS — E78.5 HYPERLIPIDEMIA, UNSPECIFIED: ICD-10-CM

## 2024-11-27 DIAGNOSIS — Z00.00 ENCOUNTER FOR GENERAL ADULT MEDICAL EXAMINATION W/OUT ABNORMAL FINDINGS: ICD-10-CM

## 2024-11-27 DIAGNOSIS — E11.69 TYPE 2 DIABETES MELLITUS WITH OTHER SPECIFIED COMPLICATION: ICD-10-CM

## 2024-11-27 PROCEDURE — 99396 PREV VISIT EST AGE 40-64: CPT

## 2024-11-27 PROCEDURE — 99213 OFFICE O/P EST LOW 20 MIN: CPT | Mod: 25

## 2024-11-27 PROCEDURE — 87880 STREP A ASSAY W/OPTIC: CPT | Mod: QW

## 2024-11-29 LAB
25(OH)D3 SERPL-MCNC: 17.5 NG/ML
ALBUMIN SERPL ELPH-MCNC: 4.7 G/DL
ALP BLD-CCNC: 82 U/L
ALT SERPL-CCNC: 59 U/L
ANION GAP SERPL CALC-SCNC: 16 MMOL/L
APPEARANCE: CLEAR
AST SERPL-CCNC: 57 U/L
BILIRUB SERPL-MCNC: 0.4 MG/DL
BILIRUBIN URINE: NEGATIVE
BLOOD URINE: NEGATIVE
BUN SERPL-MCNC: 8 MG/DL
CALCIUM SERPL-MCNC: 10.3 MG/DL
CHLORIDE SERPL-SCNC: 100 MMOL/L
CHOLEST SERPL-MCNC: 179 MG/DL
CO2 SERPL-SCNC: 23 MMOL/L
COLOR: YELLOW
CREAT SERPL-MCNC: 0.79 MG/DL
EGFR: 111 ML/MIN/1.73M2
ESTIMATED AVERAGE GLUCOSE: 134 MG/DL
GLUCOSE QUALITATIVE U: NEGATIVE MG/DL
GLUCOSE SERPL-MCNC: 73 MG/DL
HBA1C MFR BLD HPLC: 6.3 %
HCT VFR BLD CALC: 48.8 %
HDLC SERPL-MCNC: 51 MG/DL
HGB BLD-MCNC: 16 G/DL
KETONES URINE: NEGATIVE MG/DL
LDLC SERPL CALC-MCNC: 107 MG/DL
LEUKOCYTE ESTERASE URINE: NEGATIVE
MCHC RBC-ENTMCNC: 29.4 PG
MCHC RBC-ENTMCNC: 32.8 G/DL
MCV RBC AUTO: 89.5 FL
NITRITE URINE: NEGATIVE
NONHDLC SERPL-MCNC: 129 MG/DL
PH URINE: 6
PLATELET # BLD AUTO: 413 K/UL
POTASSIUM SERPL-SCNC: 4.8 MMOL/L
PROT SERPL-MCNC: 7.9 G/DL
PROTEIN URINE: NEGATIVE MG/DL
PSA FREE FLD-MCNC: 44 %
PSA FREE SERPL-MCNC: 0.15 NG/ML
PSA SERPL-MCNC: 0.34 NG/ML
RAPID RVP RESULT: DETECTED
RBC # BLD: 5.45 M/UL
RBC # FLD: 12.9 %
RV+EV RNA NPH QL NAA+NON-PROBE: DETECTED
SARS-COV-2 RNA RESP QL NAA+PROBE: NOT DETECTED
SODIUM SERPL-SCNC: 138 MMOL/L
SPECIFIC GRAVITY URINE: 1.02
TRIGL SERPL-MCNC: 118 MG/DL
TSH SERPL-ACNC: 1.48 UIU/ML
UROBILINOGEN URINE: 0.2 MG/DL
VIT B12 SERPL-MCNC: 458 PG/ML
WBC # FLD AUTO: 10.2 K/UL

## 2024-12-04 LAB — S PYO AG SPEC QL IA: NORMAL

## 2024-12-11 ENCOUNTER — RX RENEWAL (OUTPATIENT)
Age: 44
End: 2024-12-11

## 2024-12-16 ENCOUNTER — APPOINTMENT (OUTPATIENT)
Age: 44
End: 2024-12-16
Payer: COMMERCIAL

## 2024-12-16 VITALS
WEIGHT: 248 LBS | BODY MASS INDEX: 32.87 KG/M2 | OXYGEN SATURATION: 98 % | HEIGHT: 73 IN | SYSTOLIC BLOOD PRESSURE: 132 MMHG | HEART RATE: 95 BPM | RESPIRATION RATE: 16 BRPM | DIASTOLIC BLOOD PRESSURE: 84 MMHG | TEMPERATURE: 98 F

## 2024-12-16 DIAGNOSIS — E78.5 HYPERLIPIDEMIA, UNSPECIFIED: ICD-10-CM

## 2024-12-16 DIAGNOSIS — I10 ESSENTIAL (PRIMARY) HYPERTENSION: ICD-10-CM

## 2024-12-16 DIAGNOSIS — E11.69 TYPE 2 DIABETES MELLITUS WITH OTHER SPECIFIED COMPLICATION: ICD-10-CM

## 2024-12-16 DIAGNOSIS — R74.01 ELEVATION OF LEVELS OF LIVER TRANSAMINASE LEVELS: ICD-10-CM

## 2024-12-16 DIAGNOSIS — F17.213 NICOTINE DEPENDENCE, CIGARETTES, WITH WITHDRAWAL: ICD-10-CM

## 2024-12-16 PROCEDURE — 99214 OFFICE O/P EST MOD 30 MIN: CPT

## 2024-12-16 RX ORDER — EZETIMIBE 10 MG/1
10 TABLET ORAL
Qty: 90 | Refills: 0 | Status: ACTIVE | COMMUNITY
Start: 2024-12-16 | End: 1900-01-01

## 2025-02-14 ENCOUNTER — RX RENEWAL (OUTPATIENT)
Age: 45
End: 2025-02-14

## 2025-04-14 ENCOUNTER — APPOINTMENT (OUTPATIENT)
Age: 45
End: 2025-04-14
Payer: COMMERCIAL

## 2025-04-14 VITALS
DIASTOLIC BLOOD PRESSURE: 92 MMHG | HEART RATE: 85 BPM | BODY MASS INDEX: 32.47 KG/M2 | RESPIRATION RATE: 14 BRPM | OXYGEN SATURATION: 96 % | WEIGHT: 245 LBS | HEIGHT: 73 IN | SYSTOLIC BLOOD PRESSURE: 132 MMHG | TEMPERATURE: 97.6 F

## 2025-04-14 DIAGNOSIS — E11.69 TYPE 2 DIABETES MELLITUS WITH OTHER SPECIFIED COMPLICATION: ICD-10-CM

## 2025-04-14 DIAGNOSIS — J01.90 ACUTE SINUSITIS, UNSPECIFIED: ICD-10-CM

## 2025-04-14 DIAGNOSIS — I10 ESSENTIAL (PRIMARY) HYPERTENSION: ICD-10-CM

## 2025-04-14 DIAGNOSIS — E78.5 HYPERLIPIDEMIA, UNSPECIFIED: ICD-10-CM

## 2025-04-14 DIAGNOSIS — B96.89 ACUTE SINUSITIS, UNSPECIFIED: ICD-10-CM

## 2025-04-14 PROCEDURE — 99214 OFFICE O/P EST MOD 30 MIN: CPT

## 2025-04-14 RX ORDER — MONTELUKAST 10 MG/1
10 TABLET, FILM COATED ORAL DAILY
Qty: 30 | Refills: 1 | Status: ACTIVE | COMMUNITY
Start: 2025-04-14 | End: 1900-01-01

## 2025-04-14 RX ORDER — AMOXICILLIN AND CLAVULANATE POTASSIUM 875; 125 MG/1; MG/1
875-125 TABLET, COATED ORAL
Qty: 20 | Refills: 0 | Status: ACTIVE | COMMUNITY
Start: 2025-04-14 | End: 1900-01-01

## 2025-04-14 RX ORDER — IPRATROPIUM BROMIDE 42 UG/1
0.06 SPRAY, METERED NASAL 3 TIMES DAILY
Qty: 1 | Refills: 3 | Status: ACTIVE | COMMUNITY
Start: 2025-04-14 | End: 1900-01-01

## 2025-04-15 LAB
ALBUMIN SERPL ELPH-MCNC: 4.8 G/DL
ALP BLD-CCNC: 76 U/L
ALT SERPL-CCNC: 77 U/L
ANION GAP SERPL CALC-SCNC: 15 MMOL/L
AST SERPL-CCNC: 68 U/L
BILIRUB SERPL-MCNC: 0.5 MG/DL
BUN SERPL-MCNC: 13 MG/DL
CALCIUM SERPL-MCNC: 10.3 MG/DL
CHLORIDE SERPL-SCNC: 101 MMOL/L
CHOLEST SERPL-MCNC: 160 MG/DL
CO2 SERPL-SCNC: 24 MMOL/L
CREAT SERPL-MCNC: 0.91 MG/DL
EGFRCR SERPLBLD CKD-EPI 2021: 105 ML/MIN/1.73M2
GLUCOSE SERPL-MCNC: 78 MG/DL
HDLC SERPL-MCNC: 52 MG/DL
LDLC SERPL-MCNC: 85 MG/DL
NONHDLC SERPL-MCNC: 109 MG/DL
POTASSIUM SERPL-SCNC: 4.7 MMOL/L
PROT SERPL-MCNC: 7.8 G/DL
SODIUM SERPL-SCNC: 140 MMOL/L
TRIGL SERPL-MCNC: 135 MG/DL

## 2025-04-24 ENCOUNTER — TRANSCRIPTION ENCOUNTER (OUTPATIENT)
Age: 45
End: 2025-04-24

## 2025-04-25 LAB
ESTIMATED AVERAGE GLUCOSE: 154 MG/DL
HBA1C MFR BLD HPLC: 7 %

## 2025-05-08 ENCOUNTER — RX CHANGE (OUTPATIENT)
Age: 45
End: 2025-05-08

## 2025-05-08 RX ORDER — MONTELUKAST 10 MG/1
10 TABLET, FILM COATED ORAL
Qty: 90 | Refills: 1 | Status: ACTIVE | COMMUNITY
Start: 1900-01-01 | End: 1900-01-01

## 2025-05-12 ENCOUNTER — NON-APPOINTMENT (OUTPATIENT)
Age: 45
End: 2025-05-12

## 2025-05-12 ENCOUNTER — APPOINTMENT (OUTPATIENT)
Age: 45
End: 2025-05-12
Payer: COMMERCIAL

## 2025-05-12 VITALS
OXYGEN SATURATION: 98 % | SYSTOLIC BLOOD PRESSURE: 143 MMHG | DIASTOLIC BLOOD PRESSURE: 85 MMHG | BODY MASS INDEX: 32.47 KG/M2 | HEIGHT: 73 IN | TEMPERATURE: 97 F | WEIGHT: 245 LBS | HEART RATE: 77 BPM | RESPIRATION RATE: 16 BRPM

## 2025-05-12 DIAGNOSIS — E78.5 HYPERLIPIDEMIA, UNSPECIFIED: ICD-10-CM

## 2025-05-12 DIAGNOSIS — I10 ESSENTIAL (PRIMARY) HYPERTENSION: ICD-10-CM

## 2025-05-12 DIAGNOSIS — E11.69 TYPE 2 DIABETES MELLITUS WITH OTHER SPECIFIED COMPLICATION: ICD-10-CM

## 2025-05-12 PROCEDURE — 99214 OFFICE O/P EST MOD 30 MIN: CPT | Mod: 25

## 2025-05-12 PROCEDURE — 93000 ELECTROCARDIOGRAM COMPLETE: CPT

## 2025-05-28 ENCOUNTER — TRANSCRIPTION ENCOUNTER (OUTPATIENT)
Age: 45
End: 2025-05-28

## 2025-06-02 ENCOUNTER — TRANSCRIPTION ENCOUNTER (OUTPATIENT)
Age: 45
End: 2025-06-02

## 2025-06-05 ENCOUNTER — TRANSCRIPTION ENCOUNTER (OUTPATIENT)
Age: 45
End: 2025-06-05

## 2025-07-22 ENCOUNTER — TRANSCRIPTION ENCOUNTER (OUTPATIENT)
Age: 45
End: 2025-07-22

## 2025-08-11 ENCOUNTER — TRANSCRIPTION ENCOUNTER (OUTPATIENT)
Age: 45
End: 2025-08-11

## 2025-09-02 ENCOUNTER — NON-APPOINTMENT (OUTPATIENT)
Age: 45
End: 2025-09-02

## 2025-09-03 ENCOUNTER — APPOINTMENT (OUTPATIENT)
Age: 45
End: 2025-09-03
Payer: COMMERCIAL

## 2025-09-03 ENCOUNTER — TRANSCRIPTION ENCOUNTER (OUTPATIENT)
Age: 45
End: 2025-09-03

## 2025-09-03 VITALS
SYSTOLIC BLOOD PRESSURE: 141 MMHG | DIASTOLIC BLOOD PRESSURE: 87 MMHG | OXYGEN SATURATION: 96 % | HEART RATE: 106 BPM | WEIGHT: 254 LBS | BODY MASS INDEX: 33.66 KG/M2 | HEIGHT: 73 IN | TEMPERATURE: 97.7 F | RESPIRATION RATE: 14 BRPM

## 2025-09-03 DIAGNOSIS — E78.5 HYPERLIPIDEMIA, UNSPECIFIED: ICD-10-CM

## 2025-09-03 DIAGNOSIS — E66.9 OBESITY, UNSPECIFIED: ICD-10-CM

## 2025-09-03 DIAGNOSIS — E11.69 TYPE 2 DIABETES MELLITUS WITH OTHER SPECIFIED COMPLICATION: ICD-10-CM

## 2025-09-03 DIAGNOSIS — F41.9 ANXIETY DISORDER, UNSPECIFIED: ICD-10-CM

## 2025-09-03 DIAGNOSIS — I10 ESSENTIAL (PRIMARY) HYPERTENSION: ICD-10-CM

## 2025-09-03 PROCEDURE — 99214 OFFICE O/P EST MOD 30 MIN: CPT

## 2025-09-04 LAB
ALBUMIN SERPL ELPH-MCNC: 4.9 G/DL
ALP BLD-CCNC: 85 U/L
ALT SERPL-CCNC: 77 U/L
ANION GAP SERPL CALC-SCNC: 15 MMOL/L
AST SERPL-CCNC: 60 U/L
BILIRUB SERPL-MCNC: 0.4 MG/DL
BUN SERPL-MCNC: 10 MG/DL
CALCIUM SERPL-MCNC: 10.4 MG/DL
CHLORIDE SERPL-SCNC: 102 MMOL/L
CO2 SERPL-SCNC: 21 MMOL/L
CREAT SERPL-MCNC: 0.71 MG/DL
EGFRCR SERPLBLD CKD-EPI 2021: 114 ML/MIN/1.73M2
ESTIMATED AVERAGE GLUCOSE: 131 MG/DL
GLUCOSE SERPL-MCNC: 174 MG/DL
HBA1C MFR BLD HPLC: 6.2 %
POTASSIUM SERPL-SCNC: 4.3 MMOL/L
PROT SERPL-MCNC: 8.1 G/DL
SODIUM SERPL-SCNC: 138 MMOL/L

## 2025-09-05 ENCOUNTER — TRANSCRIPTION ENCOUNTER (OUTPATIENT)
Age: 45
End: 2025-09-05

## 2025-09-05 LAB
CHOLEST SERPL-MCNC: 159 MG/DL
HDLC SERPL-MCNC: 46 MG/DL
LDLC SERPL-MCNC: 83 MG/DL
NONHDLC SERPL-MCNC: 112 MG/DL
TRIGL SERPL-MCNC: 167 MG/DL